# Patient Record
Sex: FEMALE | Race: WHITE | Employment: UNEMPLOYED | URBAN - METROPOLITAN AREA
[De-identification: names, ages, dates, MRNs, and addresses within clinical notes are randomized per-mention and may not be internally consistent; named-entity substitution may affect disease eponyms.]

---

## 2017-09-07 ENCOUNTER — HOSPITAL ENCOUNTER (EMERGENCY)
Facility: CLINIC | Age: 27
Discharge: HOME | End: 2017-09-07
Attending: EMERGENCY MEDICINE | Admitting: EMERGENCY MEDICINE
Payer: COMMERCIAL

## 2017-09-07 ENCOUNTER — APPOINTMENT (OUTPATIENT)
Dept: ULTRASOUND IMAGING | Facility: CLINIC | Age: 27
End: 2017-09-07
Attending: EMERGENCY MEDICINE
Payer: COMMERCIAL

## 2017-09-07 VITALS
HEIGHT: 64 IN | HEART RATE: 81 BPM | BODY MASS INDEX: 30.73 KG/M2 | WEIGHT: 180 LBS | TEMPERATURE: 98.8 F | OXYGEN SATURATION: 99 % | DIASTOLIC BLOOD PRESSURE: 75 MMHG | SYSTOLIC BLOOD PRESSURE: 90 MMHG | RESPIRATION RATE: 16 BRPM

## 2017-09-07 DIAGNOSIS — O21.0 HYPEREMESIS GRAVIDARUM: ICD-10-CM

## 2017-09-07 PROCEDURE — 96374 THER/PROPH/DIAG INJ IV PUSH: CPT

## 2017-09-07 PROCEDURE — 76801 OB US < 14 WKS SINGLE FETUS: CPT

## 2017-09-07 PROCEDURE — 25000128 H RX IP 250 OP 636: Performed by: EMERGENCY MEDICINE

## 2017-09-07 PROCEDURE — 99285 EMERGENCY DEPT VISIT HI MDM: CPT | Mod: 25

## 2017-09-07 RX ORDER — SODIUM CHLORIDE 9 MG/ML
1000 INJECTION, SOLUTION INTRAVENOUS CONTINUOUS
Status: DISCONTINUED | OUTPATIENT
Start: 2017-09-07 | End: 2017-09-07 | Stop reason: HOSPADM

## 2017-09-07 RX ORDER — ONDANSETRON 2 MG/ML
4 INJECTION INTRAMUSCULAR; INTRAVENOUS EVERY 30 MIN PRN
Status: DISCONTINUED | OUTPATIENT
Start: 2017-09-07 | End: 2017-09-07 | Stop reason: HOSPADM

## 2017-09-07 RX ADMIN — SODIUM CHLORIDE 1000 ML: 9 INJECTION, SOLUTION INTRAVENOUS at 18:47

## 2017-09-07 RX ADMIN — ONDANSETRON 4 MG: 2 INJECTION INTRAMUSCULAR; INTRAVENOUS at 18:47

## 2017-09-07 ASSESSMENT — ENCOUNTER SYMPTOMS
VOMITING: 1
CONSTIPATION: 1
ABDOMINAL DISTENTION: 1

## 2017-09-07 NOTE — ED NOTES
Patient pregnant and vomiting.  Currently on Methadone program for chronic pain.  Patient alert and oriented x3.  Airway, breathing and circulation intact.

## 2017-09-08 NOTE — ED PROVIDER NOTES
"  History     Chief Complaint:  Nausea & Vomiting      The history is provided by the patient.      Lisseth Andrews is a 27 year old female () who presents with nausea and vomiting. The patient had a pelvic exam last week. The patient is gravid but is unsure of how long. She did not have a menstrual period in August and endorses two positive pregnancy tests. The patient reports onset of vomiting today with 6 episodes. She endorsees decreased urination, cramping, normal greenish discharge, and feeling bloated and presents today for evaluation of health. She has had symptoms of constipation and less appetite. She denies any visit to the OB, or rash and has no other complaints.     Allergies:  Amoxicillin     Medications:    Methadone HCl  Albuterol     Past Medical History:    Asthma   Chronic pain   Depression  Fibromyalgia    Past Surgical History:    History reviewed. No pertinent surgical history.     Family History:    History reviewed. No pertinent family history.      Social History:  Presents with son    PCP: Physician No Ref-Primary    Marital Status:  Single     Review of Systems   Gastrointestinal: Positive for abdominal distention, constipation and vomiting.   Genitourinary: Positive for decreased urine volume and vaginal discharge (Normal ).   Skin: Negative for rash.   All other systems reviewed and are negative.    Physical Exam     Patient Vitals for the past 24 hrs:   BP Temp Temp src Pulse Resp SpO2 Height Weight   17 1930 90/75 - - - - - - -   17 1900 110/75 - - - - 99 % - -   17 1747 117/77 98.8  F (37.1  C) Temporal 81 16 99 % 1.626 m (5' 4\") 81.6 kg (180 lb)      Physical Exam  Constitutional: Patient is well appearing. No distress.  Head: Atraumatic.  Mouth/Throat: Oropharynx is clear and moist. No oropharyngeal exudate.  Eyes: Conjunctivae and EOM are normal. No scleral icterus.  Neck: Normal range of motion. Neck supple.   Cardiovascular: Normal rate, regular rhythm, " normal heart sounds and intact distal pulses.   Pulmonary/Chest: Breath sounds normal. No respiratory distress.  Abdominal: Soft. Bowel sounds are normal. No distension. No tenderness. No rebound or guarding.   Musculoskeletal: Normal range of motion. No edema or tenderness.   Neurological: Alert and orientated to person, place, and time. No observable focal neuro deficit  Skin: Warm and dry. No rash noted. Not diaphoretic.    Emergency Department Course     Imaging:  Radiographic findings were communicated with the patient who voiced understanding of the findings.  US OB <14 Weeks with transvaginal:  IMPRESSION: Single live intrauterine pregnancy measuring 6 weeks 0  days gestational age.    Imaging independently reviewed and agree with radiologist interpretation.      Laboratory:  UA UPT per report.    Interventions:  1847: NS 1L IV Bolus    1847: Zofran 4 mg IV   Medications   0.9% sodium chloride BOLUS (1,000 mLs Intravenous New Bag 9/7/17 1847)       Emergency Department Course:  Past medical records, nursing notes, and vitals reviewed.  1903: I performed an exam of the patient and obtained history, as documented above.  IV inserted and blood drawn.   Above interventions provided.   The patient was sent for a CT and US while in the emergency department, findings above.       Impression & Plan             Medical Decision Making:  Pt left before completion.    US as above urine had not returned.  Elopement.   Diagnosis:    ICD-10-CM    1. Hyperemesis gravidarum O21.0        Disposition:  Elopement    Discharge Medications:  Discharge Medication List as of 9/7/2017  9:13 PM            Michael Scott  9/7/2017   [facility] EMERGENCY DEPARTMENT  I, Michael Scott, am serving as a scribe at 7:03 PM on 9/7/2017 to document services personally performed by Mark Sher MD based on my observations and the provider's statements to me.       Mark Sher MD  09/08/17 0107

## 2018-05-22 ENCOUNTER — HOSPITAL ENCOUNTER (EMERGENCY)
Facility: CLINIC | Age: 28
Discharge: HOME | End: 2018-05-22
Attending: EMERGENCY MEDICINE | Admitting: EMERGENCY MEDICINE
Payer: COMMERCIAL

## 2018-05-22 VITALS
RESPIRATION RATE: 18 BRPM | BODY MASS INDEX: 29.02 KG/M2 | HEART RATE: 66 BPM | OXYGEN SATURATION: 100 % | WEIGHT: 170 LBS | TEMPERATURE: 98.2 F | DIASTOLIC BLOOD PRESSURE: 67 MMHG | SYSTOLIC BLOOD PRESSURE: 121 MMHG | HEIGHT: 64 IN

## 2018-05-22 DIAGNOSIS — O21.9 NAUSEA AND VOMITING DURING PREGNANCY: ICD-10-CM

## 2018-05-22 LAB
ALBUMIN UR-MCNC: 30 MG/DL
ANION GAP SERPL CALCULATED.3IONS-SCNC: 9 MMOL/L (ref 3–14)
APPEARANCE UR: ABNORMAL
BASOPHILS # BLD AUTO: 0 10E9/L (ref 0–0.2)
BASOPHILS NFR BLD AUTO: 0.2 %
BILIRUB UR QL STRIP: NEGATIVE
BUN SERPL-MCNC: 11 MG/DL (ref 7–30)
CALCIUM SERPL-MCNC: 9.4 MG/DL (ref 8.5–10.1)
CHLORIDE SERPL-SCNC: 102 MMOL/L (ref 94–109)
CO2 SERPL-SCNC: 26 MMOL/L (ref 20–32)
COLOR UR AUTO: YELLOW
CREAT SERPL-MCNC: 0.72 MG/DL (ref 0.52–1.04)
DIFFERENTIAL METHOD BLD: NORMAL
EOSINOPHIL # BLD AUTO: 0 10E9/L (ref 0–0.7)
EOSINOPHIL NFR BLD AUTO: 0.1 %
ERYTHROCYTE [DISTWIDTH] IN BLOOD BY AUTOMATED COUNT: 12.2 % (ref 10–15)
GFR SERPL CREATININE-BSD FRML MDRD: >90 ML/MIN/1.7M2
GLUCOSE SERPL-MCNC: 99 MG/DL (ref 70–99)
GLUCOSE UR STRIP-MCNC: NEGATIVE MG/DL
HCT VFR BLD AUTO: 40.5 % (ref 35–47)
HGB BLD-MCNC: 14.6 G/DL (ref 11.7–15.7)
HGB UR QL STRIP: NEGATIVE
IMM GRANULOCYTES # BLD: 0 10E9/L (ref 0–0.4)
IMM GRANULOCYTES NFR BLD: 0.3 %
KETONES UR STRIP-MCNC: 80 MG/DL
LEUKOCYTE ESTERASE UR QL STRIP: ABNORMAL
LYMPHOCYTES # BLD AUTO: 1.2 10E9/L (ref 0.8–5.3)
LYMPHOCYTES NFR BLD AUTO: 12.7 %
MCH RBC QN AUTO: 31.4 PG (ref 26.5–33)
MCHC RBC AUTO-ENTMCNC: 36 G/DL (ref 31.5–36.5)
MCV RBC AUTO: 87 FL (ref 78–100)
MONOCYTES # BLD AUTO: 0.4 10E9/L (ref 0–1.3)
MONOCYTES NFR BLD AUTO: 4.7 %
MUCOUS THREADS #/AREA URNS LPF: PRESENT /LPF
NEUTROPHILS # BLD AUTO: 7.6 10E9/L (ref 1.6–8.3)
NEUTROPHILS NFR BLD AUTO: 82 %
NITRATE UR QL: NEGATIVE
NRBC # BLD AUTO: 0 10*3/UL
NRBC BLD AUTO-RTO: 0 /100
PH UR STRIP: 7 PH (ref 5–7)
PLATELET # BLD AUTO: 165 10E9/L (ref 150–450)
POTASSIUM SERPL-SCNC: 3.5 MMOL/L (ref 3.4–5.3)
RBC # BLD AUTO: 4.65 10E12/L (ref 3.8–5.2)
RBC #/AREA URNS AUTO: 1 /HPF (ref 0–2)
SODIUM SERPL-SCNC: 137 MMOL/L (ref 133–144)
SOURCE: ABNORMAL
SP GR UR STRIP: 1.02 (ref 1–1.03)
SQUAMOUS #/AREA URNS AUTO: 2 /HPF (ref 0–1)
UROBILINOGEN UR STRIP-MCNC: 2 MG/DL (ref 0–2)
WBC # BLD AUTO: 9.3 10E9/L (ref 4–11)
WBC #/AREA URNS AUTO: 7 /HPF (ref 0–5)

## 2018-05-22 PROCEDURE — 80048 BASIC METABOLIC PNL TOTAL CA: CPT | Performed by: EMERGENCY MEDICINE

## 2018-05-22 PROCEDURE — 99285 EMERGENCY DEPT VISIT HI MDM: CPT | Mod: 25

## 2018-05-22 PROCEDURE — 85025 COMPLETE CBC W/AUTO DIFF WBC: CPT | Performed by: EMERGENCY MEDICINE

## 2018-05-22 PROCEDURE — 96375 TX/PRO/DX INJ NEW DRUG ADDON: CPT

## 2018-05-22 PROCEDURE — 96361 HYDRATE IV INFUSION ADD-ON: CPT

## 2018-05-22 PROCEDURE — 99284 EMERGENCY DEPT VISIT MOD MDM: CPT | Mod: 25

## 2018-05-22 PROCEDURE — 76801 OB US < 14 WKS SINGLE FETUS: CPT

## 2018-05-22 PROCEDURE — 81001 URINALYSIS AUTO W/SCOPE: CPT | Performed by: EMERGENCY MEDICINE

## 2018-05-22 PROCEDURE — 96374 THER/PROPH/DIAG INJ IV PUSH: CPT

## 2018-05-22 PROCEDURE — 96376 TX/PRO/DX INJ SAME DRUG ADON: CPT

## 2018-05-22 PROCEDURE — 25000128 H RX IP 250 OP 636: Performed by: EMERGENCY MEDICINE

## 2018-05-22 RX ORDER — ONDANSETRON 2 MG/ML
4 INJECTION INTRAMUSCULAR; INTRAVENOUS ONCE
Status: COMPLETED | OUTPATIENT
Start: 2018-05-22 | End: 2018-05-22

## 2018-05-22 RX ORDER — DIPHENHYDRAMINE HYDROCHLORIDE 50 MG/ML
25 INJECTION INTRAMUSCULAR; INTRAVENOUS ONCE
Status: COMPLETED | OUTPATIENT
Start: 2018-05-22 | End: 2018-05-22

## 2018-05-22 RX ORDER — METOCLOPRAMIDE HYDROCHLORIDE 5 MG/ML
10 INJECTION INTRAMUSCULAR; INTRAVENOUS ONCE
Status: DISCONTINUED | OUTPATIENT
Start: 2018-05-22 | End: 2018-05-22

## 2018-05-22 RX ORDER — ONDANSETRON 4 MG/1
4 TABLET, ORALLY DISINTEGRATING ORAL EVERY 8 HOURS PRN
Qty: 10 TABLET | Refills: 0 | Status: SHIPPED | OUTPATIENT
Start: 2018-05-22 | End: 2018-05-25

## 2018-05-22 RX ADMIN — ONDANSETRON 4 MG: 2 INJECTION INTRAMUSCULAR; INTRAVENOUS at 05:14

## 2018-05-22 RX ADMIN — DIPHENHYDRAMINE HYDROCHLORIDE 25 MG: 50 INJECTION, SOLUTION INTRAMUSCULAR; INTRAVENOUS at 05:02

## 2018-05-22 RX ADMIN — SODIUM CHLORIDE 1000 ML: 9 INJECTION, SOLUTION INTRAVENOUS at 05:02

## 2018-05-22 RX ADMIN — ONDANSETRON 4 MG: 2 INJECTION INTRAMUSCULAR; INTRAVENOUS at 06:08

## 2018-05-22 ASSESSMENT — ENCOUNTER SYMPTOMS
FEVER: 0
VOMITING: 1
NAUSEA: 1
ABDOMINAL PAIN: 0

## 2018-05-22 NOTE — DISCHARGE INSTRUCTIONS
Please follow up closely at your scheduled appointment with your Ob-gyn tomorrow.     Please return to the ED if your symptoms worsen or if you develop new or concerning symptoms.       Discharge Instructions  Vomiting    You have been seen today for vomiting. This is usually caused by a virus, but some bacteria, parasites, medicines or other medical conditions can cause similar symptoms. At this time your doctor does not find that your vomiting is a sign of anything dangerous or life-threatening. However, sometimes the signs of serious illness do not show up right away. If you have new or worse symptoms, you may need to be seen again in the emergency department or by your primary doctor. Remember that serious problems like appendicitis can start as vomiting.     Return to the Emergency Department if:    You keep throwing up and you are not able to keep liquids down.     You feel you are getting dehydrated, such as being very thirsty, not urinating at least every 8-12 hours, or feeling faint or lightheaded.     You develop a new fever, or your fever continues for more than 2 days.     You have belly pain that seems worse than cramps, is in one spot, or is getting worse over time.     You have blood in your vomit or stools.     You feel very weak.    You are not starting to improve within 24 hours of your visit here.     What can I do to help myself?    The most important thing to do is to drink clear liquids. If you have been vomiting a lot, it is best to have only small, frequent sips of liquids. Drinking too much at once may cause more vomiting. If you are vomiting often, you must replace minerals, sodium and potassium lost with your illness. Pedialyte  and sports drinks can help you replace these minerals. You can also drink clear liquids such as water, weak tea, apple juice, and 7-Up . Avoid acid liquids (orange), caffeine (coffee) or alcohol. Do not drink milk until you no longer have diarrhea.     After  liquids are staying down, you may start eating mild foods. Soda crackers, toast, plain noodles, gelatin, applesauce and bananas are good first choices. Avoid foods that have acid, are spicy, fatty or have a lot of fiber (such as meats, coarse grains, vegetables). You may start eating these foods again in about 3 days when you are better.     Sometimes treatment includes prescription medicine to prevent nausea and vomiting. If your doctor prescribes these for you, take them as directed.     Don t take ibuprofen, or other nonsteroidal anti-inflammatory medicines without checking with your healthcare provider.   If you were given a prescription for medicine here today, be sure to read all of the information (including the package insert) that comes with your prescription.  This will include important information about the medicine, its side effects, and any warnings that you need to know about.  The pharmacist who fills the prescription can provide more information and answer questions you may have about the medicine.  If you have questions or concerns that the pharmacist cannot address, please call or return to the Emergency Department.       Opioid Medication Information    Pain medications are among the most commonly prescribed medicines, so we are including this information for all our patients. If you did not receive pain medication or get a prescription for pain medicine, you can ignore it.     You may have been given a prescription for an opioid (narcotic) pain medicine and/or have received a pain medicine while here in the Emergency Department. These medicines can make you drowsy or impaired. You must not drive, operate dangerous equipment, or engage in any other dangerous activities while taking these medications. If you drive while taking these medications, you could be arrested for DUI, or driving under the influence. Do not drink any alcohol while you are taking these medications.     Opioid pain  medications can cause addiction. If you have a history of chemical dependency of any type, you are at a higher risk of becoming addicted to pain medications.  Only take these prescribed medications to treat your pain when all other options have been tried. Take it for as short a time and as few doses as possible. Store your pain pills in a secure place, as they are frequently stolen and provide a dangerous opportunity for children or visitors in your house to start abusing these powerful medications. We will not replace any lost or stolen medicine.  As soon as your pain is better, you should flush all your remaining medication.     Many prescription pain medications contain Tylenol  (acetaminophen), including Vicodin , Tylenol #3 , Norco , Lortab , and Percocet .  You should not take any extra pills of Tylenol  if you are using these prescription medications or you can get very sick.  Do not ever take more than 3000 mg of acetaminophen in any 24 hour period.    All opioids tend to cause constipation. Drink plenty of water and eat foods that have a lot of fiber, such as fruits, vegetables, prune juice, apple juice and high fiber cereal.  Take a laxative if you don t move your bowels at least every other day. Miralax , Milk of Magnesia, Colace , or Senna  can be used to keep you regular.      Remember that you can always come back to the Emergency Department if you are not able to see your regular doctor in the amount of time listed above, if you get any new symptoms, or if there is anything that worries you.

## 2018-05-22 NOTE — ED AVS SNAPSHOT
[facility] Emergency Department    [address]    [facility] [address]    Phone:  [phone]    Fax:  [phone]                                       Lisseth Andrews   MRN: [id number]    Department:  [facility] Emergency Department   Date of Visit:  5/22/2018           Patient Information     Date Of Birth          1990        Your diagnoses for this visit were:     Nausea and vomiting during pregnancy        You were seen by Rosalina Kothari MD and Billie Peña MD.      Follow-up Information     Follow up with [facility] In 3 days.    Specialties:  Sports Medicine, Pain Management, Obstetrics & Gynecology, Pediatrics, Internal Medicine, Nephrology    Contact information:    [address]  [facility] [address] [phone]        Follow up with [facility] Emergency Department.    Specialty:  EMERGENCY MEDICINE    Why:  If symptoms worsen    Contact information:    [address]  [facility] [address] [phone]        Discharge Instructions       Please follow up closely at your scheduled appointment with your Ob-gyn tomorrow.     Please return to the ED if your symptoms worsen or if you develop new or concerning symptoms.       Discharge Instructions  Vomiting    You have been seen today for vomiting. This is usually caused by a virus, but some bacteria, parasites, medicines or other medical conditions can cause similar symptoms. At this time your doctor does not find that your vomiting is a sign of anything dangerous or life-threatening. However, sometimes the signs of serious illness do not show up right away. If you have new or worse symptoms, you may need to be seen again in the emergency department or by your primary doctor. Remember that serious problems like appendicitis can start as vomiting.     Return to the Emergency Department if:    You keep throwing up and  you are not able to keep liquids down.     You feel you are getting dehydrated, such as being very thirsty, not urinating at least every 8-12 hours, or feeling faint or lightheaded.     You develop a new fever, or your fever continues for more than 2 days.     You have belly pain that seems worse than cramps, is in one spot, or is getting worse over time.     You have blood in your vomit or stools.     You feel very weak.    You are not starting to improve within 24 hours of your visit here.     What can I do to help myself?    The most important thing to do is to drink clear liquids. If you have been vomiting a lot, it is best to have only small, frequent sips of liquids. Drinking too much at once may cause more vomiting. If you are vomiting often, you must replace minerals, sodium and potassium lost with your illness. Pedialyte  and sports drinks can help you replace these minerals. You can also drink clear liquids such as water, weak tea, apple juice, and 7-Up . Avoid acid liquids (orange), caffeine (coffee) or alcohol. Do not drink milk until you no longer have diarrhea.     After liquids are staying down, you may start eating mild foods. Soda crackers, toast, plain noodles, gelatin, applesauce and bananas are good first choices. Avoid foods that have acid, are spicy, fatty or have a lot of fiber (such as meats, coarse grains, vegetables). You may start eating these foods again in about 3 days when you are better.     Sometimes treatment includes prescription medicine to prevent nausea and vomiting. If your doctor prescribes these for you, take them as directed.     Don t take ibuprofen, or other nonsteroidal anti-inflammatory medicines without checking with your healthcare provider.   If you were given a prescription for medicine here today, be sure to read all of the information (including the package insert) that comes with your prescription.  This will include important information about the medicine, its  side effects, and any warnings that you need to know about.  The pharmacist who fills the prescription can provide more information and answer questions you may have about the medicine.  If you have questions or concerns that the pharmacist cannot address, please call or return to the Emergency Department.       Opioid Medication Information    Pain medications are among the most commonly prescribed medicines, so we are including this information for all our patients. If you did not receive pain medication or get a prescription for pain medicine, you can ignore it.     You may have been given a prescription for an opioid (narcotic) pain medicine and/or have received a pain medicine while here in the Emergency Department. These medicines can make you drowsy or impaired. You must not drive, operate dangerous equipment, or engage in any other dangerous activities while taking these medications. If you drive while taking these medications, you could be arrested for DUI, or driving under the influence. Do not drink any alcohol while you are taking these medications.     Opioid pain medications can cause addiction. If you have a history of chemical dependency of any type, you are at a higher risk of becoming addicted to pain medications.  Only take these prescribed medications to treat your pain when all other options have been tried. Take it for as short a time and as few doses as possible. Store your pain pills in a secure place, as they are frequently stolen and provide a dangerous opportunity for children or visitors in your house to start abusing these powerful medications. We will not replace any lost or stolen medicine.  As soon as your pain is better, you should flush all your remaining medication.     Many prescription pain medications contain Tylenol  (acetaminophen), including Vicodin , Tylenol #3 , Norco , Lortab , and Percocet .  You should not take any extra pills of Tylenol  if you are using these  prescription medications or you can get very sick.  Do not ever take more than 3000 mg of acetaminophen in any 24 hour period.    All opioids tend to cause constipation. Drink plenty of water and eat foods that have a lot of fiber, such as fruits, vegetables, prune juice, apple juice and high fiber cereal.  Take a laxative if you don t move your bowels at least every other day. Miralax , Milk of Magnesia, Colace , or Senna  can be used to keep you regular.      Remember that you can always come back to the Emergency Department if you are not able to see your regular doctor in the amount of time listed above, if you get any new symptoms, or if there is anything that worries you.          24 Hour Appointment Hotline       To make an appointment at any [facility], call [phone] ([phone]). If you don't have a family doctor or clinic, we will help you find one. [place] clinics are conveniently located to serve the needs of you and your family.             Review of your medicines      START taking        Dose / Directions Last dose taken    ondansetron 4 MG ODT tab   Commonly known as:  ZOFRAN ODT   Dose:  4 mg   Quantity:  10 tablet        Take 1 tablet (4 mg) by mouth every 8 hours as needed   Refills:  0          Our records show that you are taking the medicines listed below. If these are incorrect, please call your family doctor or clinic.        Dose / Directions Last dose taken    ALBUTEROL IN        None Entered   Refills:  0        METHADONE HCL PO   Dose:  60 mg        Take 60 mg by mouth daily   Refills:  0                Prescriptions were sent or printed at these locations (1 Prescription)                   Other Prescriptions                Printed at Department/Unit printer (1 of 1)         ondansetron (ZOFRAN ODT) 4 MG ODT tab                Procedures and tests performed during your visit     Basic metabolic panel (BMP)    CBC + differential    POC US OB TRANSABDOMINAL LIMITED    UA  with Microscopic      Orders Needing Specimen Collection     None      Pending Results     Date and Time Order Name Status Description    5/22/2018 0453 POC US OB TRANSABDOMINAL LIMITED In process             Pending Culture Results     No orders found from 5/20/2018 to 5/23/2018.            Pending Results Instructions     If you had any lab results that were not finalized at the time of your Discharge, you can call the ED Lab Result RN at [phone]. You will be contacted by this team for any positive Lab results or changes in treatment. The nurses are available 7 days a week from 10A to 6:30P.  You can leave a message 24 hours per day and they will return your call.        Test Results From Your Hospital Stay        5/22/2018  5:11 AM      Component Results     Component Value Ref Range & Units Status    WBC 9.3 4.0 - 11.0 10e9/L Final    RBC Count 4.65 3.8 - 5.2 10e12/L Final    Hemoglobin 14.6 11.7 - 15.7 g/dL Final    Hematocrit 40.5 35.0 - 47.0 % Final    MCV 87 78 - 100 fl Final    MCH 31.4 26.5 - 33.0 pg Final    MCHC 36.0 31.5 - 36.5 g/dL Final    RDW 12.2 10.0 - 15.0 % Final    Platelet Count 165 150 - 450 10e9/L Final    Diff Method Automated Method  Final    % Neutrophils 82.0 % Final    % Lymphocytes 12.7 % Final    % Monocytes 4.7 % Final    % Eosinophils 0.1 % Final    % Basophils 0.2 % Final    % Immature Granulocytes 0.3 % Final    Nucleated RBCs 0 0 /100 Final    Absolute Neutrophil 7.6 1.6 - 8.3 10e9/L Final    Absolute Lymphocytes 1.2 0.8 - 5.3 10e9/L Final    Absolute Monocytes 0.4 0.0 - 1.3 10e9/L Final    Absolute Eosinophils 0.0 0.0 - 0.7 10e9/L Final    Absolute Basophils 0.0 0.0 - 0.2 10e9/L Final    Abs Immature Granulocytes 0.0 0 - 0.4 10e9/L Final    Absolute Nucleated RBC 0.0  Final         5/22/2018  5:27 AM      Component Results     Component Value Ref Range & Units Status    Sodium 137 133 - 144 mmol/L Final    Potassium 3.5 3.4 - 5.3 mmol/L Final    Chloride 102 94 - 109 mmol/L  Final    Carbon Dioxide 26 20 - 32 mmol/L Final    Anion Gap 9 3 - 14 mmol/L Final    Glucose 99 70 - 99 mg/dL Final    Urea Nitrogen 11 7 - 30 mg/dL Final    Creatinine 0.72 0.52 - 1.04 mg/dL Final    GFR Estimate >90 >60 mL/min/1.7m2 Final    Non  GFR Calc    GFR Estimate If Black >90 >60 mL/min/1.7m2 Final    African American GFR Calc    Calcium 9.4 8.5 - 10.1 mg/dL Final         5/22/2018  6:11 AM      Component Results     Component Value Ref Range & Units Status    Color Urine Yellow  Final    Appearance Urine Slightly Cloudy  Final    Glucose Urine Negative NEG^Negative mg/dL Final    Bilirubin Urine Negative NEG^Negative Final    Ketones Urine 80 (A) NEG^Negative mg/dL Final    Specific Gravity Urine 1.021 1.003 - 1.035 Final    Blood Urine Negative NEG^Negative Final    pH Urine 7.0 5.0 - 7.0 pH Final    Protein Albumin Urine 30 (A) NEG^Negative mg/dL Final    Urobilinogen mg/dL 2.0 0.0 - 2.0 mg/dL Final    Nitrite Urine Negative NEG^Negative Final    Leukocyte Esterase Urine Trace (A) NEG^Negative Final    Source Midstream Urine  Final    WBC Urine 7 (H) 0 - 5 /HPF Final    RBC Urine 1 0 - 2 /HPF Final    Squamous Epithelial /HPF Urine 2 (H) 0 - 1 /HPF Final    Mucous Urine Present (A) NEG^Negative /LPF Final         5/22/2018  5:13 AM      [facility] Procedure Note     Limited Bedside ED Pelvic Ultrasound (PREGNANT PATIENT):    PROCEDURE: PERFORMED BY: Dr. Rosalina Kothari  INDICATIONS/SYMPTOM: Pregnancy, Vomiting  PROBE: Low frequency convex probe  Type of US Study: Transabdominal Exam  BODY LOCATION: Pelvis  FINDINGS: Fetal heart rate: Present and counted at 144 bpm.  INTERPRETATION: Intrauterine pregnancy present. FHR was 144 with noted fetal activity.    IMAGE DOCUMENTATION: Images were archived to hard drive.                Clinical Quality Measure: Blood Pressure Screening     Your blood pressure was checked while you were in the emergency department today. The  "last reading we obtained was  BP: 121/67 . Please read the guidelines below about what these numbers mean and what you should do about them.  If your systolic blood pressure (the top number) is less than 120 and your diastolic blood pressure (the bottom number) is less than 80, then your blood pressure is normal. There is nothing more that you need to do about it.  If your systolic blood pressure (the top number) is 120-139 or your diastolic blood pressure (the bottom number) is 80-89, your blood pressure may be higher than it should be. You should have your blood pressure rechecked within a year by a primary care provider.  If your systolic blood pressure (the top number) is 140 or greater or your diastolic blood pressure (the bottom number) is 90 or greater, you may have high blood pressure. High blood pressure is treatable, but if left untreated over time it can put you at risk for heart attack, stroke, or kidney failure. You should have your blood pressure rechecked by a primary care provider within the next 4 weeks.  If your provider in the emergency department today gave you specific instructions to follow-up with your doctor or provider even sooner than that, you should follow that instruction and not wait for up to 4 weeks for your follow-up visit.        Thank you for choosing [facility]       Thank you for choosing [facility] for your care. Our goal is always to provide you with excellent care. Hearing back from our patients is one way we can continue to improve our services. Please take a few minutes to complete the written survey that you may receive in the mail after you visit with us. Thank you!        [employer] Information     [employer] lets you send messages to your doctor, view your test results, renew your prescriptions, schedule appointments and more. To sign up, go to [email or web address] . Click on \"Log in\" on the left side of the screen, which will take you to the Welcome page. Then click on \"Sign " "up Now\" on the right side of the page.     You will be asked to enter the access code listed below, as well as some personal information. Please follow the directions to create your username and password.     Your access code is: [email or web address]  Expires: 2018  7:44 AM     Your access code will  in 90 days. If you need help or a new code, please call your [place] clinic or [phone].        Care EveryWhere ID     This is your Care EveryWhere ID. This could be used by other organizations to access your [place] medical records  [id number]        Equal Access to Services     [facility] HospitalROSMERY : Yossi Vasques, bernadette royal, oralia villarreal, anila lewis . So [facility] [phone].    ATENCIÓN: Si habla español, tiene a horan disposición servicios gratuitos de asistencia lingüística. Katyame al [phone].    We comply with applicable federal civil rights laws and [place] laws. We do not discriminate on the basis of race, color, national origin, age, disability, sex, sexual orientation, or gender identity.            After Visit Summary       This is your record. Keep this with you and show to your community pharmacist(s) and doctor(s) at your next visit.                  "

## 2018-05-22 NOTE — ED TRIAGE NOTES
Pt here for nausea and vomiting started about 0400 yesterday and ongoing, vomited over 20 times since then. Was seen at  about 1 week ago, prescribed zofran but unable to get it fill due to insurance. ABCs intact, gcs 15, AA&Ox3.

## 2018-05-22 NOTE — ED PROVIDER NOTES
"  History     Chief Complaint:  Nausea and Vomiting     HPI   Lisseth Andrews is a  approximately 9 week pregnant 28 year old female who presents via EMS for evaluation of nausea and vomiting. Approximately two weeks ago the patient started to develop nausea and frequent vomiting. She was seen in an urgent care regarding this about a week ago and was prescribed Zofran but was unable to get this filled due to insurance issues. On , the patient was seen in an [facility] ED for similar symptoms. At that time, the patient had a bedside ultrasound that confirmed an IUP with good fetal movement and cardiac activity. She also had a negative UA and was discharged to home. This morning around 0400, the patient awoke with ongoing nausea and vomiting, prompting her to call EMS to bring her into the ED for evaluation. She was given a dose of Zofran ODT prior to arrival with some improvement of her symptoms. Otherwise, she has not had any measured fever, abdominal pain, vaginal discharge, or vaginal bleeding recently. She is scheduled to see her OB on .     Allergies:  Amoxicillin      Medications:    Albuterol  Methadone     Past Medical History:    Anxiety  Asthma  Chronic pain  Depression  Fibromyalgia  Post-traumatic stress disorder     Past Surgical History:    History reviewed. No pertinent past surgical history.     Family History:    History reviewed. No pertinent family history.     Social History:  Marital status:    Single   Accompanied to ED by:  EMS      Review of Systems   Constitutional: Negative for fever.   Gastrointestinal: Positive for nausea and vomiting. Negative for abdominal pain.   Genitourinary: Negative for vaginal bleeding and vaginal discharge.   All other systems reviewed and are negative.    Physical Exam   First Vitals:  BP: 114/75  Pulse: 66  Heart Rate: 66  Temp: 98.2  F (36.8  C)  Resp: 18  Height: 162.6 cm (5' 4\")  Weight: 77.1 kg (170 lb)  SpO2: 98 %      Physical " Exam  Constitutional: The patient is oriented to person, place, and time. Alert and cooperative.  HENT:   Right Ear: External ear normal.   Left Ear: External ear normal.   Nose: Nose normal.   Mouth/Throat: Uvula is midline, oropharynx is clear and moist and mucous membranes are normal. No posterior oropharyngeal edema or erythema.   Eyes: Conjunctivae, EOM and lids are normal. Pupils are equal, round, and reactive to light.   Neck: Trachea normal. Normal range of motion. Neck supple.   Cardiovascular: Normal rate, regular rhythm, normal heart sounds, and intact distal pulses.    Pulmonary/Chest: Effort normal and breath sounds equal bilaterally. No crackles or wheezing.   Abdominal: Soft. No tenderness to palpation. No rebound and no guarding.   Musculoskeletal: Normal range of motion.    Neurological: Alert and Oriented. Moves all extremities equally.   Skin: Skin is dry. No rash noted.          Emergency Department Course       Laboratory:  CBC: WNL (WBC 9.3, HGB 14.6, )  BMP: WNL (Creatinine 0.72)     UA with Microscopic: yellow, cloudy, 80 urine ketones, negative blood, nitrite negative, trace leuk est, 7 wbc's, 2 epithelial cells    Procedures:         POC US OB TRANSABDOMINAL LIMITED (In process) Result time: 05/22/18 05:12:02     In process by Rosalina Kothari MD (05/22/18 05:12:02)     Impression:     [facility] Procedure Note     Limited Bedside ED Pelvic Ultrasound (PREGNANT PATIENT):    PROCEDURE: PERFORMED BY: Dr. Rosalina Kothari  INDICATIONS/SYMPTOM: Pregnancy, Vomiting  PROBE: Low frequency convex probe  Type of US Study: Transabdominal Exam  BODY LOCATION: Pelvis  FINDINGS: Fetal heart rate: Present and counted at 144 bpm.  INTERPRETATION: Intrauterine pregnancy present. FHR was 144 with noted fetal activity.    IMAGE DOCUMENTATION: Images were archived to hard drive.       Interventions:  0502 Benadryl 25 mg IV   0502 NS 1,000 mL IV   0514 Zofran 4 mg IV     Emergency Department  Course:  Patient was brought to the ED via EMS.     Nursing notes and vitals reviewed.  0446: I performed an exam of the patient as documented above.     0455:  A POCUS was performed as outlined in the procedure note above.  The patient tolerated well and there were no complications.     I personally reviewed the laboratory results with the patient and answered all related questions prior to discharge.     Findings and plan explained to the Patient. Patient discharged home with instructions regarding supportive care, medications, and reasons to return. The importance of close follow-up was reviewed.     Impression & Plan      Medical Decision Making:  Lisseth Andrews is a  approximately 9 week pregnant 28 year old female who presents via EMS for evaluation of nausea and vomiting.  Upon presentation in the ED, the patient is nontoxic-appearing and vitals are within normal limits and stable.  On exam, she is well-appearing.  She is alert and with a nonfocal neurologic exam.  Cardiopulmonary exam is unremarkable.  Abdomen is soft and nontender throughout.  The rest of her exam is as mentioned above.  She was given IV fluids and antiemetic.  Bedside ultrasound was performed and demonstrates an intrauterine pregnancy with good fetal heart rate.  Labs were obtained and are as mentioned above.  Notably, she does not have a leukocytosis.  She has no significant electrolyte abnormality.  The patient denies any abdominal pain or vaginal bleeding to warrant formal OB ultrasound at this time. She has no abdominal tenderness on exam to suggest an acute surgical abdomen or to warrant further imaging of the abdomen at this time. Given the patient's history and presentation, I am most suspicious for nausea and vomiting of pregnancy.  The patient was signed out to my colleague pending repeat evaluation following IV fluid hydration and antiemetics.  If the patient is able to tolerate PO, I do feel she is safe for discharge to  home.  She notes that she has an appointment with her OB/GYN tomorrow, therefore she can follow up closely at this time.  She was stable/improved at the time of signout.     Diagnosis:    ICD-10-CM    1. Nausea and vomiting during pregnancy O21.9 UA with Microscopic       Disposition:  Discharged to home.     Discharge Medications:  New Prescriptions    ONDANSETRON (ZOFRAN ODT) 4 MG ODT TAB    Take 1 tablet (4 mg) by mouth every 8 hours as needed         Adriano FUNEZ, am serving as a scribe at 4:46 AM on 5/22/2018 to document services personally performed by Dr. Kothari, based on my observations and the provider's statements to me.    [facility] EMERGENCY DEPARTMENT       Rosalina Kothari MD  05/22/18 0643

## 2018-05-22 NOTE — ED PROVIDER NOTES
Patient endorsed to me by Dr. Kothari pending PO challenge in this 28 year old female with nausea and vomiting in first trimester.     Patient tolerated PO after 2 doses of Zofran as well as Benadryl and fluids. Clinically patient appears well on repeat evaluation and states she feels much improved since arrival. She is amenable to discharge plan as outlined by Dr. Kothari. She has appointment with OB tomorrow which I instructed her to keep though she will return with new or concerning symptoms.      Billie Peña MD  05/22/18 0743

## 2018-05-22 NOTE — ED AVS SNAPSHOT
[facility] [place] [facility] Emergency Department    [address]    [facility] [address]    Phone:  [phone]    Fax:  [phone]                                       Lisseth Andrews   MRN: [id number]    Department:  [facility] Emergency Department   Date of Visit:  5/22/2018           After Visit Summary Signature Page     I have received my discharge instructions, and my questions have been answered. I have discussed any challenges I see with this plan with the nurse or doctor.    ..........................................................................................................................................  Patient/Patient Representative Signature      ..........................................................................................................................................  Patient Representative Print Name and Relationship to Patient    ..................................................               ................................................  Date                                            Time    ..........................................................................................................................................  Reviewed by Signature/Title    ...................................................              ..............................................  Date                                                            Time

## 2019-03-31 ENCOUNTER — APPOINTMENT (OUTPATIENT)
Dept: GENERAL RADIOLOGY | Facility: CLINIC | Age: 29
End: 2019-03-31
Attending: EMERGENCY MEDICINE
Payer: COMMERCIAL

## 2019-03-31 ENCOUNTER — HOSPITAL ENCOUNTER (EMERGENCY)
Facility: CLINIC | Age: 29
Discharge: HOME | End: 2019-03-31
Attending: EMERGENCY MEDICINE | Admitting: EMERGENCY MEDICINE
Payer: COMMERCIAL

## 2019-03-31 VITALS
DIASTOLIC BLOOD PRESSURE: 68 MMHG | BODY MASS INDEX: 29.18 KG/M2 | TEMPERATURE: 99.5 F | SYSTOLIC BLOOD PRESSURE: 115 MMHG | HEART RATE: 53 BPM | RESPIRATION RATE: 16 BRPM | WEIGHT: 170 LBS | OXYGEN SATURATION: 100 %

## 2019-03-31 DIAGNOSIS — R00.2 PALPITATIONS: Primary | ICD-10-CM

## 2019-03-31 DIAGNOSIS — R11.2 NON-INTRACTABLE VOMITING WITH NAUSEA, UNSPECIFIED VOMITING TYPE: ICD-10-CM

## 2019-03-31 LAB
ALBUMIN SERPL-MCNC: 3.6 G/DL (ref 3.4–5)
ALBUMIN UR-MCNC: NEGATIVE MG/DL
ALP SERPL-CCNC: 83 U/L (ref 40–150)
ALT SERPL W P-5'-P-CCNC: 18 U/L (ref 0–50)
ANION GAP SERPL CALCULATED.3IONS-SCNC: 3 MMOL/L (ref 3–14)
APPEARANCE UR: CLEAR
AST SERPL W P-5'-P-CCNC: 27 U/L (ref 0–45)
BACTERIA #/AREA URNS HPF: ABNORMAL /HPF
BASOPHILS # BLD AUTO: 0 10E9/L (ref 0–0.2)
BASOPHILS NFR BLD AUTO: 0.3 %
BILIRUB SERPL-MCNC: 0.2 MG/DL (ref 0.2–1.3)
BILIRUB UR QL STRIP: NEGATIVE
BUN SERPL-MCNC: 18 MG/DL (ref 7–30)
CALCIUM SERPL-MCNC: 8.6 MG/DL (ref 8.5–10.1)
CHLORIDE SERPL-SCNC: 106 MMOL/L (ref 94–109)
CO2 SERPL-SCNC: 29 MMOL/L (ref 20–32)
COLOR UR AUTO: ABNORMAL
CREAT SERPL-MCNC: 0.84 MG/DL (ref 0.52–1.04)
D DIMER PPP FEU-MCNC: 0.5 UG/ML FEU (ref 0–0.5)
DIFFERENTIAL METHOD BLD: NORMAL
EOSINOPHIL # BLD AUTO: 0.1 10E9/L (ref 0–0.7)
EOSINOPHIL NFR BLD AUTO: 1.5 %
ERYTHROCYTE [DISTWIDTH] IN BLOOD BY AUTOMATED COUNT: 14.4 % (ref 10–15)
GFR SERPL CREATININE-BSD FRML MDRD: >90 ML/MIN/{1.73_M2}
GLUCOSE SERPL-MCNC: 105 MG/DL (ref 70–99)
GLUCOSE UR STRIP-MCNC: NEGATIVE MG/DL
HCG UR QL: NEGATIVE
HCT VFR BLD AUTO: 38.6 % (ref 35–47)
HGB BLD-MCNC: 12.9 G/DL (ref 11.7–15.7)
HGB UR QL STRIP: NEGATIVE
IMM GRANULOCYTES # BLD: 0 10E9/L (ref 0–0.4)
IMM GRANULOCYTES NFR BLD: 0.1 %
INTERPRETATION ECG - MUSE: NORMAL
KETONES UR STRIP-MCNC: NEGATIVE MG/DL
LEUKOCYTE ESTERASE UR QL STRIP: NEGATIVE
LIPASE SERPL-CCNC: 79 U/L (ref 73–393)
LYMPHOCYTES # BLD AUTO: 2.1 10E9/L (ref 0.8–5.3)
LYMPHOCYTES NFR BLD AUTO: 30.6 %
MCH RBC QN AUTO: 29.4 PG (ref 26.5–33)
MCHC RBC AUTO-ENTMCNC: 33.4 G/DL (ref 31.5–36.5)
MCV RBC AUTO: 88 FL (ref 78–100)
MONOCYTES # BLD AUTO: 0.5 10E9/L (ref 0–1.3)
MONOCYTES NFR BLD AUTO: 7.5 %
NEUTROPHILS # BLD AUTO: 4 10E9/L (ref 1.6–8.3)
NEUTROPHILS NFR BLD AUTO: 60 %
NITRATE UR QL: NEGATIVE
NRBC # BLD AUTO: 0 10*3/UL
NRBC BLD AUTO-RTO: 0 /100
PH UR STRIP: 6 PH (ref 5–7)
PLATELET # BLD AUTO: 215 10E9/L (ref 150–450)
POTASSIUM SERPL-SCNC: 3.8 MMOL/L (ref 3.4–5.3)
PROT SERPL-MCNC: 7.1 G/DL (ref 6.8–8.8)
RBC # BLD AUTO: 4.39 10E12/L (ref 3.8–5.2)
RBC #/AREA URNS AUTO: 1 /HPF (ref 0–2)
SODIUM SERPL-SCNC: 138 MMOL/L (ref 133–144)
SOURCE: ABNORMAL
SP GR UR STRIP: 1.01 (ref 1–1.03)
SQUAMOUS #/AREA URNS AUTO: <1 /HPF (ref 0–1)
TROPONIN I SERPL-MCNC: <0.015 UG/L (ref 0–0.04)
TSH SERPL DL<=0.005 MIU/L-ACNC: 1.79 MU/L (ref 0.4–4)
UROBILINOGEN UR STRIP-MCNC: NORMAL MG/DL (ref 0–2)
WBC # BLD AUTO: 6.7 10E9/L (ref 4–11)
WBC #/AREA URNS AUTO: 4 /HPF (ref 0–5)

## 2019-03-31 PROCEDURE — 84443 ASSAY THYROID STIM HORMONE: CPT | Performed by: EMERGENCY MEDICINE

## 2019-03-31 PROCEDURE — 99285 EMERGENCY DEPT VISIT HI MDM: CPT | Mod: 25

## 2019-03-31 PROCEDURE — 81025 URINE PREGNANCY TEST: CPT | Performed by: EMERGENCY MEDICINE

## 2019-03-31 PROCEDURE — 81001 URINALYSIS AUTO W/SCOPE: CPT | Performed by: EMERGENCY MEDICINE

## 2019-03-31 PROCEDURE — 25000128 H RX IP 250 OP 636: Performed by: EMERGENCY MEDICINE

## 2019-03-31 PROCEDURE — 71046 X-RAY EXAM CHEST 2 VIEWS: CPT

## 2019-03-31 PROCEDURE — 84484 ASSAY OF TROPONIN QUANT: CPT | Performed by: EMERGENCY MEDICINE

## 2019-03-31 PROCEDURE — 93005 ELECTROCARDIOGRAM TRACING: CPT

## 2019-03-31 PROCEDURE — 96361 HYDRATE IV INFUSION ADD-ON: CPT

## 2019-03-31 PROCEDURE — 96374 THER/PROPH/DIAG INJ IV PUSH: CPT

## 2019-03-31 PROCEDURE — 85379 FIBRIN DEGRADATION QUANT: CPT | Performed by: EMERGENCY MEDICINE

## 2019-03-31 PROCEDURE — 83690 ASSAY OF LIPASE: CPT | Performed by: EMERGENCY MEDICINE

## 2019-03-31 PROCEDURE — 85025 COMPLETE CBC W/AUTO DIFF WBC: CPT | Performed by: EMERGENCY MEDICINE

## 2019-03-31 PROCEDURE — 96375 TX/PRO/DX INJ NEW DRUG ADDON: CPT

## 2019-03-31 PROCEDURE — 80053 COMPREHEN METABOLIC PANEL: CPT | Performed by: EMERGENCY MEDICINE

## 2019-03-31 RX ORDER — HYDROXYZINE HYDROCHLORIDE 50 MG/1
50 TABLET, FILM COATED ORAL 3 TIMES DAILY PRN
COMMUNITY

## 2019-03-31 RX ORDER — LORAZEPAM 2 MG/ML
1 INJECTION INTRAMUSCULAR ONCE
Status: COMPLETED | OUTPATIENT
Start: 2019-03-31 | End: 2019-03-31

## 2019-03-31 RX ORDER — ONDANSETRON 4 MG/1
4 TABLET, ORALLY DISINTEGRATING ORAL EVERY 8 HOURS PRN
Qty: 10 TABLET | Refills: 0 | Status: SHIPPED | OUTPATIENT
Start: 2019-03-31 | End: 2019-09-16

## 2019-03-31 RX ORDER — ONDANSETRON 2 MG/ML
4 INJECTION INTRAMUSCULAR; INTRAVENOUS ONCE
Status: COMPLETED | OUTPATIENT
Start: 2019-03-31 | End: 2019-03-31

## 2019-03-31 RX ADMIN — SODIUM CHLORIDE 1000 ML: 9 INJECTION, SOLUTION INTRAVENOUS at 02:20

## 2019-03-31 RX ADMIN — LORAZEPAM 1 MG: 2 INJECTION INTRAMUSCULAR; INTRAVENOUS at 03:01

## 2019-03-31 RX ADMIN — ONDANSETRON 4 MG: 2 INJECTION INTRAMUSCULAR; INTRAVENOUS at 02:20

## 2019-03-31 SDOH — HEALTH STABILITY: MENTAL HEALTH: HOW OFTEN DO YOU HAVE A DRINK CONTAINING ALCOHOL?: NEVER

## 2019-03-31 ASSESSMENT — ENCOUNTER SYMPTOMS
SHORTNESS OF BREATH: 0
BACK PAIN: 1
VOMITING: 1
COUGH: 1
ABDOMINAL PAIN: 1
PALPITATIONS: 1
COLOR CHANGE: 1
LIGHT-HEADEDNESS: 1
FEVER: 1
NAUSEA: 1

## 2019-03-31 NOTE — ED PROVIDER NOTES
History     Chief Complaint:  Vomiting, Palpitations, and Chest Pain     The history is provided by the patient.      Lisseth Andrews is a 29 year old female who presents via EMS with progressively worsening nausea since waking up at 2230. She reports one episode of vomiting and annotates she is the most concerned with subsequent onset of chest pain and palpitations. Patient also complains of lightheadedness, upper back pain, and epigastric pain as well as mild cough and congestion. Additionally, she states she felt feverish earlier. She denies any current chest pain but endorses continued palpitations. She denies any shortness of breath or recent injuries. She affirms a color change of unknown origin on the left-side of her face. Patient reports she is currently on the last day of her Holter monitor monitoring her propensity for palpitations.     Allergies:  Amoxicillin: rash    Medications:    Atarax  Methadone    Past Medical History:    Anxiety  Asthma  Chronic pain  Fibromyalgia  Depression  PTSD    Past Surgical History:    History reviewed. No pertinent surgical history.    Family History:    History reviewed. No pertinent family history.    Social History:  Smoking Status: Current Every Day Smoker (PPD: 0.25)  Alcohol Use: Negative  Marital Status: Single      Review of Systems   Constitutional: Positive for fever.   HENT: Positive for congestion.    Respiratory: Positive for cough. Negative for shortness of breath.    Cardiovascular: Positive for chest pain and palpitations.   Gastrointestinal: Positive for abdominal pain, nausea and vomiting.   Musculoskeletal: Positive for back pain.   Skin: Positive for color change.   Neurological: Positive for light-headedness.   All other systems reviewed and are negative.    Physical Exam     Patient Vitals for the past 24 hrs:   BP Temp Temp src Pulse Heart Rate Resp SpO2 Weight   03/31/19 0230 116/62 -- -- 53 62 -- -- --   03/31/19 0115 125/77 -- -- -- -- --  99 % --   03/31/19 0114 127/79 99.5  F (37.5  C) Oral -- 77 16 100 % 77.1 kg (170 lb)     Physical Exam  General: Alert, appears well-developed and well-nourished. Cooperative.     In mild distress  HEENT:  Head:  Atraumatic  Ears:  External ears are normal  Mouth/Throat:  Oropharynx is without erythema or exudate and mucous membranes are dry.   Eyes:   Conjunctivae normal and EOM are normal. No scleral icterus.  CV:  Normal rate, regular rhythm, normal heart sounds and radial pulses are 2+ and symmetric.  No murmur.  Resp:  Breath sounds are clear bilaterally    Non-labored, no retractions or accessory muscle use  GI:  Abdomen is soft, no distension, no tenderness. No rebound or guarding.  No CVA tenderness bilaterally  MS:  Normal range of motion. No edema.    Normal strength in all 4 extremities.     Back atraumatic.    No midline cervical, thoracic, or lumbar tenderness  Skin:  Warm and dry.  No rash or lesions noted.  Neuro: Alert. Normal strength.  GCS: 15  Psych:  Normal mood and affect.    Emergency Department Course     ECG:  ECG taken at 0154, ECG read at 0205  Normal sinus rhythm with sinus arrhythmia  Prolonged QT  Abnormal ECG  Rate 67 bpm. SC interval 152 ms. QRS duration 88 ms. QT/QTc 464/490 ms. P-R-T axes 35 31 18.    Imaging:  Radiology findings were communicated with the patient who voiced understanding of the findings.    XR Chest 2 Views  Normal two views of the chest.   KRISTI HOWELL MD  Reading per radiology    Laboratory:  Laboratory findings were communicated with the patient who voiced understanding of the findings.    UA: bacteria few (A) o/w WNL  UPT: Negative  Lipase: 79  CBC: WBC 6.7, HGB 12.9,   CMP: glucose 105 (H) o/w WNL (Creatinine 0.84)  D dimer: 0.5  Troponin (Collected 0151): <0.015  TSH with free T4 reflex: 1.79    Interventions:  0220: NS 1L IV Bolus   0220: Zofran 4 mg IV  0301: Ativan 1 mg IV    Emergency Department Course:  0119 Nursing notes and vitals  reviewed.    0130 I performed an exam of the patient as documented above.     0151 IV was inserted and blood was drawn for laboratory testing, results above. The patient provided a urine sample here in the emergency department. This was sent for laboratory testing, findings above.    0154 EKG obtained in the ED, see results above.     0235 The patient was sent for a x-ray while in the emergency department, results above.     0326 Patient rechecked and updated. I personally reviewed the imaging and laboratory results with the patient and answered all related questions prior to discharge.    Impression & Plan      Medical Decision Making:  Patient is a 29-year-old female with past medical history of fibromyalgia, depression, anxiety, and PTSD who presents with nausea and one episode of vomiting.  She also has persistent palpitations.  Patient has been seen by her primary care physician for these palpitations in the past and is currently on a Holter monitor which concludes in the next 24-48 hours.  She is not actively having palpitations here today, and reassuringly her EKG shows no concerning ischemic changes or abnormal rhythms today.  There are no prior EKGs to compare with.  Due to patient's nausea and vomiting we did do a broad workup.  CMP and CBC are unremarkable.  Lipase within normal limits.  Patient's troponin was undetectable and d-dimer within normal limits, lower concern for pulmonary embolism or ACS.  Patient had a normal TSH, lower concern for thyroid dysfunction.  Urinalysis with no concerns for infection and UPT negative.  Unclear the exact etiology of the patient's nausea and vomiting symptoms but suspicion for potential GERD versus gastritis versus potential viral illness.  She has a nonfocal abdominal exam and low concern for sinister intra-abdominal pathologies.  No indication for advanced CT imaging or other imaging studies at this time.  Return precautions for development of fever, inability to  tolerate oral intake, or any new or worsening symptoms were discussed at bedside prior to discharge.  All questions answered prior to discharge.  She will continue with her Holter monitor and follow-up as discussed with her primary care provider with cardiology in the next 1-2 weeks.  Discharged home.    Diagnosis:    ICD-10-CM   1. Palpitations R00.2     Disposition:   The patient is discharged to home.    Scribe Disclosure:  Harjeet FUNEZ, am serving as a scribe at 1:30 AM on 3/31/2019 to document services personally performed by Carlito Garcia MD based on my observations and the provider's statements to me.    [facility] EMERGENCY DEPARTMENT       Carlito Garcia MD  03/31/19 0848

## 2019-03-31 NOTE — DISCHARGE INSTRUCTIONS
Discharge Instructions  Vomiting    You have been seen today for vomiting (throwing up). This is usually caused by a virus, but some bacteria, parasites, medicines or other medical conditions can cause similar symptoms. At this time your provider does not find that your vomiting is a sign of anything dangerous or life-threatening. However, sometimes the signs of serious illness do not show up right away. If you have new or worse symptoms, you may need to be seen again in the Emergency Department or by your primary provider. Remember that serious problems like appendicitis can start as vomiting.    Generally, every Emergency Department visit should have a follow-up clinic visit with either a primary or a specialty clinic/provider. Please follow-up as instructed by your emergency provider today.    Return to the Emergency Department if:  You keep vomiting and you are not able to keep liquids down.   You feel you are getting dehydrated, such as being very thirsty, not urinating (peeing) at least every 8-12 hours, or feeling faint or lightheaded.   You develop a new fever, or your fever continues for more than 2 days.   You have abdominal (belly pain) that seems worse than cramps, is in one spot, or is getting worse over time. Appendicitis usually causes pain in the right lower abdomen (to the right and below your belly button) so watch for pain in this location.  You have blood in your vomit or stools.   You feel very weak.  You are not starting to improve within 24 hours of your visit here.     What can I do to help myself?  The most important thing to do is to drink clear liquids. If you have been vomiting a lot, it is best to have only small, frequent sips of liquids. Drinking too much at once may cause more vomiting. If you are vomiting often, you must replace minerals, sodium and potassium lost with your illness. Pedialyte  is the best available rehydration liquid but some find that it doesn?t taste good so sports  drinks are an alterative. You can also drink clear liquids such as water, weak tea, apple juice, and 7-Up . Avoid acid liquids (orange), caffeine (coffee) or alcohol. Do not drink milk until you no longer have diarrhea (loose stools).   After liquids are staying down, you may start eating mild foods. Soda crackers, toast, plain noodles, gelatin, applesauce and bananas are good first choices. Avoid foods that have acid, are spicy, fatty or have a lot of fiber (such as meats, coarse grains, vegetables). You may start eating these foods again in about 3 days when you are better.   Sometimes treatment includes prescription medicine to prevent nausea (sick to your stomach) and vomiting. If your provider prescribes these for you, take them as directed.   Do not take ibuprofen, naproxen, or other nonsteroidal anti-inflammatory (NSAID) medicines without checking with your healthcare provider.     If you were given a prescription for medicine here today, be sure to read all of the information (including the package insert) that comes with your prescription.  This will include important information about the medicine, its side effects, and any warnings that you need to know about.  The pharmacist who fills the prescription can provide more information and answer questions you may have about the medicine.  If you have questions or concerns that the pharmacist cannot address, please call or return to the Emergency Department.     Remember that you can always come back to the Emergency Department if you are not able to see your regular provider in the amount of time listed above, if you get any new symptoms, or if there is anything that worries you.  Discharge Instructions  Palpitations    Palpitations are an unusual awareness of your heartbeat. People often describe this as the heart skipping, fluttering, racing, irregular, or pounding. At this time, your provider has found no signs that your palpitations are due to a serious  or life-threatening condition. However, sometimes there is a serious problem that does not show up right away.    Palpitations can be caused by caffeine, cigarettes, diet pills, energy drinks or supplements, other stimulants, and medications and street drugs. They can also be caused by anxiety, hormone conditions such as high thyroid, and other medical conditions. Sometimes they are a sign of abnormal rhythm in the heart. At this time, your provider did not find any dangerous cause of your symptoms.    Generally, every Emergency Department visit should have a follow-up clinic visit with either a primary or a specialty clinic/provider. Please follow-up as instructed by your emergency provider today.    Return to the Emergency Department if:  You get chest pain or tightness.  You are short of breath.  You get very weak or tired.  You pass out or faint.  Your heart rate is over 120 beats per minute for more than 10 minutes while you are resting.   You have anything else that worries you.    What can I do to help myself?  Fill any prescriptions the provider gave you and take them right away.   Follow your provider?s instructions about the prescription medicines you are on. Sometimes the provider may tell you to stop taking a medicine or change the dose.  If you smoke, this may be a good time to quit! The less you can smoke, the better.  Do not use energy drinks, diet pills, or stimulants. Limit your use of caffeine.  If you were given a prescription for medicine here today, be sure to read all of the information (including the package insert) that comes with your prescription.  This will include important information about the medicine, its side effects, and any warnings that you need to know about.  The pharmacist who fills the prescription can provide more information and answer questions you may have about the medicine.  If you have questions or concerns that the pharmacist cannot address, please call or return to  the Emergency Department.     Remember that you can always come back to the Emergency Department if you are not able to see your regular provider in the amount of time listed above, if you get any new symptoms, or if there is anything that worries you.

## 2019-03-31 NOTE — ED AVS SNAPSHOT
[facility] Emergency Department  [address]  [facility] [address]  Phone:  [phone]  Fax:  [phone]                                    Lisseth Andrews   MRN: [id number]    Department:  [facility] Emergency Department   Date of Visit:  3/31/2019           After Visit Summary Signature Page    I have received my discharge instructions, and my questions have been answered. I have discussed any challenges I see with this plan with the nurse or doctor.    ..........................................................................................................................................  Patient/Patient Representative Signature      ..........................................................................................................................................  Patient Representative Print Name and Relationship to Patient    ..................................................               ................................................  Date                                   Time    ..........................................................................................................................................  Reviewed by Signature/Title    ...................................................              ..............................................  Date                                               Time          22EPIC Rev 08/18

## 2019-03-31 NOTE — ED TRIAGE NOTES
Awoke with nausea at 2230, emesis x one, felt heart racing , is being evaluAted for racing heart had been on holter monitor which she will turn in tomorrow, otherwise alert and oriented

## 2019-03-31 NOTE — ED NOTES
Bed: ED13  Expected date: 3/31/19  Expected time: 1:01 AM  Means of arrival: Ambulance  Comments:  [phone]

## 2019-04-09 ENCOUNTER — APPOINTMENT (OUTPATIENT)
Dept: GENERAL RADIOLOGY | Facility: CLINIC | Age: 29
End: 2019-04-09
Attending: PHYSICIAN ASSISTANT
Payer: COMMERCIAL

## 2019-04-09 ENCOUNTER — HOSPITAL ENCOUNTER (EMERGENCY)
Facility: CLINIC | Age: 29
Discharge: HOME | End: 2019-04-09
Attending: PHYSICIAN ASSISTANT | Admitting: PHYSICIAN ASSISTANT
Payer: COMMERCIAL

## 2019-04-09 VITALS
BODY MASS INDEX: 30.04 KG/M2 | TEMPERATURE: 98.9 F | DIASTOLIC BLOOD PRESSURE: 78 MMHG | HEART RATE: 80 BPM | WEIGHT: 175 LBS | OXYGEN SATURATION: 99 % | SYSTOLIC BLOOD PRESSURE: 121 MMHG | RESPIRATION RATE: 20 BRPM

## 2019-04-09 DIAGNOSIS — R07.9 CHEST PAIN, UNSPECIFIED TYPE: ICD-10-CM

## 2019-04-09 DIAGNOSIS — R42 DIZZINESS: ICD-10-CM

## 2019-04-09 LAB
ANION GAP SERPL CALCULATED.3IONS-SCNC: 3 MMOL/L (ref 3–14)
BASOPHILS # BLD AUTO: 0 10E9/L (ref 0–0.2)
BASOPHILS NFR BLD AUTO: 0.5 %
BUN SERPL-MCNC: 10 MG/DL (ref 7–30)
CALCIUM SERPL-MCNC: 8.9 MG/DL (ref 8.5–10.1)
CHLORIDE SERPL-SCNC: 105 MMOL/L (ref 94–109)
CO2 SERPL-SCNC: 30 MMOL/L (ref 20–32)
CREAT SERPL-MCNC: 0.84 MG/DL (ref 0.52–1.04)
D DIMER PPP FEU-MCNC: 0.5 UG/ML FEU (ref 0–0.5)
DIFFERENTIAL METHOD BLD: NORMAL
EOSINOPHIL # BLD AUTO: 0 10E9/L (ref 0–0.7)
EOSINOPHIL NFR BLD AUTO: 0.5 %
ERYTHROCYTE [DISTWIDTH] IN BLOOD BY AUTOMATED COUNT: 13.2 % (ref 10–15)
GFR SERPL CREATININE-BSD FRML MDRD: >90 ML/MIN/{1.73_M2}
GLUCOSE SERPL-MCNC: 115 MG/DL (ref 70–99)
HCG SERPL QL: NEGATIVE
HCT VFR BLD AUTO: 37.8 % (ref 35–47)
HGB BLD-MCNC: 12.7 G/DL (ref 11.7–15.7)
IMM GRANULOCYTES # BLD: 0 10E9/L (ref 0–0.4)
IMM GRANULOCYTES NFR BLD: 0.2 %
LYMPHOCYTES # BLD AUTO: 1.2 10E9/L (ref 0.8–5.3)
LYMPHOCYTES NFR BLD AUTO: 18.3 %
MCH RBC QN AUTO: 29.3 PG (ref 26.5–33)
MCHC RBC AUTO-ENTMCNC: 33.6 G/DL (ref 31.5–36.5)
MCV RBC AUTO: 87 FL (ref 78–100)
MONOCYTES # BLD AUTO: 0.4 10E9/L (ref 0–1.3)
MONOCYTES NFR BLD AUTO: 5.8 %
NEUTROPHILS # BLD AUTO: 4.7 10E9/L (ref 1.6–8.3)
NEUTROPHILS NFR BLD AUTO: 74.7 %
NRBC # BLD AUTO: 0 10*3/UL
NRBC BLD AUTO-RTO: 0 /100
PLATELET # BLD AUTO: 220 10E9/L (ref 150–450)
POTASSIUM SERPL-SCNC: 4 MMOL/L (ref 3.4–5.3)
RBC # BLD AUTO: 4.33 10E12/L (ref 3.8–5.2)
SODIUM SERPL-SCNC: 138 MMOL/L (ref 133–144)
T4 FREE SERPL-MCNC: 0.84 NG/DL (ref 0.76–1.46)
TROPONIN I SERPL-MCNC: <0.015 UG/L (ref 0–0.04)
TSH SERPL DL<=0.005 MIU/L-ACNC: 0.3 MU/L (ref 0.4–4)
WBC # BLD AUTO: 6.3 10E9/L (ref 4–11)

## 2019-04-09 PROCEDURE — 85025 COMPLETE CBC W/AUTO DIFF WBC: CPT | Performed by: PHYSICIAN ASSISTANT

## 2019-04-09 PROCEDURE — 25000128 H RX IP 250 OP 636: Performed by: PHYSICIAN ASSISTANT

## 2019-04-09 PROCEDURE — 84703 CHORIONIC GONADOTROPIN ASSAY: CPT | Performed by: PHYSICIAN ASSISTANT

## 2019-04-09 PROCEDURE — 96360 HYDRATION IV INFUSION INIT: CPT

## 2019-04-09 PROCEDURE — 99285 EMERGENCY DEPT VISIT HI MDM: CPT | Mod: 25

## 2019-04-09 PROCEDURE — 84443 ASSAY THYROID STIM HORMONE: CPT | Performed by: PHYSICIAN ASSISTANT

## 2019-04-09 PROCEDURE — 84439 ASSAY OF FREE THYROXINE: CPT | Performed by: PHYSICIAN ASSISTANT

## 2019-04-09 PROCEDURE — 84484 ASSAY OF TROPONIN QUANT: CPT | Performed by: PHYSICIAN ASSISTANT

## 2019-04-09 PROCEDURE — 71046 X-RAY EXAM CHEST 2 VIEWS: CPT

## 2019-04-09 PROCEDURE — 80048 BASIC METABOLIC PNL TOTAL CA: CPT | Performed by: PHYSICIAN ASSISTANT

## 2019-04-09 PROCEDURE — 85379 FIBRIN DEGRADATION QUANT: CPT | Performed by: PHYSICIAN ASSISTANT

## 2019-04-09 PROCEDURE — 93005 ELECTROCARDIOGRAM TRACING: CPT

## 2019-04-09 RX ADMIN — SODIUM CHLORIDE 1000 ML: 9 INJECTION, SOLUTION INTRAVENOUS at 15:53

## 2019-04-09 ASSESSMENT — ENCOUNTER SYMPTOMS
MYALGIAS: 1
LIGHT-HEADEDNESS: 1
HEMATURIA: 0
DYSURIA: 0
PALPITATIONS: 1
DIZZINESS: 1
FREQUENCY: 0
VOMITING: 0
DIFFICULTY URINATING: 0
ABDOMINAL PAIN: 1
NAUSEA: 1
COUGH: 0
SHORTNESS OF BREATH: 0
FEVER: 0
NERVOUS/ANXIOUS: 1
HEADACHES: 1

## 2019-04-09 NOTE — ED NOTES
Patient presents with mid-sternal chest pain with feelings of a high heart rate. Patient states she misplaced her methadone today, and has not taken her dose, which is making her feel worse. ABCDs intact, alert and oriented x 4.

## 2019-04-09 NOTE — ED PROVIDER NOTES
"  History     Chief Complaint:  Chest Pain    HPI   Lisseth Andrews is a 29 year old female with a history of asthma and anxiety who presents to the emergency department today with heart palpitations and chest pain among other concerns. The patient reports that this has been ongoing for sometime now and that she has been seen and evaluated for a racing heart in the emergency department numerous times in the past. She has undergone imaging of her chest, which returned unremarkable and on 4/8/19 had a 24 hour Holter monitor placed. In addition to this, the patient has tried gabapentin, hydroxyzine and ativan, but continues to have symptoms.Today the patient reports that she awoke feeling anxious and dizzy. She was unable to take her methadone and this only exacerbated her symptoms, prompting arrival here today. The patient has been in and out of the ED, traveling between her mother's home and her own home, and states that she thinks that she might have misplaced her medications. Furthermore, she notes that her blood sugar has been high and she is concerned that she could be diabetic. The patient does not otherwise have a history of high blood sugars and her most recent lab studies on 4/6/19 reveal a blood sugar of 72. Here the patient continues to endorse room spinning dizziness with associated nausea, lightheadedness and a headache that shoots through her temporal regions bilaterally with occasional \"tingling.\" She reports midsternal chest pain that radiates into her left arm pit and arm and notes that at times this feels hot. Additionally, she has mild ongoing ankle swelling bilaterally, but she denies cough or shortness of breath. She denies vomiting or any further concerns or symptoms otherwise such as hematuria, dysuria, frequency or urgency.      Allergies:  Amoxicillin  Bupropion  Ephedrine    Medications:    Albuterol   Atarax   Methadone     Past Medical History:    Anxiety   Asthma   Chronic pain "   Depression   Fibromyalgia   PTSD     Past Surgical History:    History reviewed. No pertinent surgical history.     Family History:    History reviewed. No pertinent family history.      Social History:  The patient was not accompanied to the ED.  Smoking Status: Current every day   Smokeless Tobacco: Never  Alcohol Use: No    Marital Status:  Single [1]    Review of Systems   Constitutional: Negative for fever.   Respiratory: Negative for cough and shortness of breath.    Cardiovascular: Positive for chest pain, palpitations and leg swelling.   Gastrointestinal: Positive for abdominal pain and nausea. Negative for vomiting.   Genitourinary: Negative for difficulty urinating, dysuria, frequency, hematuria and urgency.   Musculoskeletal: Positive for myalgias (left arm).   Neurological: Positive for dizziness, light-headedness and headaches.   Psychiatric/Behavioral: The patient is nervous/anxious.    All other systems reviewed and are negative.      Physical Exam     Patient Vitals for the past 24 hrs:   BP Temp Temp src Heart Rate Resp SpO2 Weight   04/09/19 1645 121/78 -- -- 71 -- 99 % --   04/09/19 1600 123/81 -- -- 91 -- 97 % --   04/09/19 1526 -- -- -- -- -- -- 79.4 kg (175 lb)   04/09/19 1524 (!) 158/94 98.9  F (37.2  C) Oral 112 20 100 % --       Physical Exam  Constitutional: anxious, but well appearing, no distress  Head: No external signs of trauma noted to head or face.   Eyes: Pupils are equal, round, and reactive to light. Conjunctiva normal. EOMI.  ENT: MMM. Normal voice.   Neck: normal ROM.    Cardiovascular: Normal rate, regular rhythm, and intact distal pulses.    Respiratory: Effort normal. No respiratory distress. Lungs clear to auscultation bilaterally. Chest wall non-tender to palpation.   GI: Soft. There is no tenderness.   Musculoskeletal: No deformities appreciated. Normal ROM. No edema noted.  Neurological: Alert and Oriented x 3. Speech normal. Moves all extremities equally. CN II-XII  intact. Coordination normal. Normal strength and sensation in upper and lower extremities. Gait normal.   Psychiatric: anxious and tearful  Skin: Skin is warm and dry.        Emergency Department Course     ECG (15:27:27):  Rate 100 bpm. HI interval 156. QRS duration 94. QT/QTc 362/466. P-R-T axes 56 22 8. Normal sinus rhythm. Interpreted at 1530 by Gwendolyn Rain PA-C.     Imaging:  Radiology findings were communicated with the patient who voiced understanding of the findings.    Chest XR 2 Views:  IMPRESSION: No active infiltrates    As read by radiology    Laboratory:  Laboratory findings were communicated with the patient who voiced understanding of the findings.    CBC: WNL (WBC 6.3, HGB 12.7, )  CMP:  (H), o/w WNL (Creatinine 0.84)  Troponin (Collected 155): <0.015   D-dimer: 0.5  TSH with free T4 reflex: 0.30 (L)  T4 Free: 0.84    HCG Qualitative: Negative     Interventions:  1553 - NS Bolus 1,000mL IV     Emergency Department Course:  Nursing notes and vitals reviewed.    1530: I performed an exam of the patient as documented above.     1707: I rechecked the patient. Findings and plan explained to the Patient. Patient discharged home with instructions regarding supportive care, medications, and reasons to return. The importance of close follow-up was reviewed.     Impression & Plan      Medical Decision Makin year old female presenting with multiple complaints, including dizziness, tachycardia, chest pain. EKG showed normal sinus rhythm without evidence of ischemia or arrhythmia. Troponin is negative. Symptoms have been constant for several days so this is sufficient to rule out ACS at this time given atypical symptoms. D-dimer is within normal limits, making PE unlikely. CXR does not reveal any evidence of pneumonia, pulmonary edema, or pneumothorax. She complains of tachycardia, and did initially have mild tachycardia on arrival, but this resolved with IV fluids. There is no  evidence of any arrhythmia on cardiac monitoring. Her TSH is mildly low, but T4 is within normal limits. She was informed of this result and instructed to follow-up with primary care to have it rechecked. She complains of dizziness and headaches, but these sound chronic in nature. She has a normal neurologic exam and is not febrile. I do not feel she requires imaging or LP at this time as I have low suspicion for stroke, meningitis, tumor, intracranial hemorrhage, or other serious pathology. Patient has been seen for these symptoms multiple times over the past several weeks with negative work-ups each time. She is extremely anxious and I suspect some of her symptoms are related to anxiety. She also did not take her methadone today and does not have her ativan either so some of her symptoms could be attributed to this. She does not appear to be in benzo withdrawal at this time. Her mild tachycardia has resolved and her vital signs are normal. With her negative evaluation today I think she is appropriate for discharge home with close follow-up with her primary care provider. She was instructed to return to the ED for any new or worsening symptoms.     Diagnosis:    ICD-10-CM    1. Chest pain, unspecified type R07.9    2. Dizziness R42        Disposition:  discharged to home      Ne Lacey  4/9/2019   [facility] EMERGENCY DEPARTMENT  I, Ne Lacey, am serving as a scribe at 3:30 PM on 4/9/2019 to document services personally performed by Gwendolyn Rain PA-C based on my observations and the provider's statements to me.       Gwendolyn Rain PA-C  04/09/19 1732

## 2019-04-09 NOTE — ED AVS SNAPSHOT
[facility] Emergency Department  [address]  [facility] [address]  Phone:  [phone]  Fax:  [phone]                                    Lisseth Andrews   MRN: [id number]    Department:  [facility] Emergency Department   Date of Visit:  4/9/2019           After Visit Summary Signature Page    I have received my discharge instructions, and my questions have been answered. I have discussed any challenges I see with this plan with the nurse or doctor.    ..........................................................................................................................................  Patient/Patient Representative Signature      ..........................................................................................................................................  Patient Representative Print Name and Relationship to Patient    ..................................................               ................................................  Date                                   Time    ..........................................................................................................................................  Reviewed by Signature/Title    ...................................................              ..............................................  Date                                               Time          22EPIC Rev 08/18

## 2019-04-10 LAB — INTERPRETATION ECG - MUSE: NORMAL

## 2019-07-08 ENCOUNTER — HOSPITAL ENCOUNTER (EMERGENCY)
Facility: CLINIC | Age: 29
Discharge: HOME | End: 2019-07-08
Attending: NURSE PRACTITIONER | Admitting: NURSE PRACTITIONER
Payer: COMMERCIAL

## 2019-07-08 ENCOUNTER — APPOINTMENT (OUTPATIENT)
Dept: ULTRASOUND IMAGING | Facility: CLINIC | Age: 29
End: 2019-07-08
Attending: PHYSICIAN ASSISTANT
Payer: COMMERCIAL

## 2019-07-08 VITALS
SYSTOLIC BLOOD PRESSURE: 121 MMHG | DIASTOLIC BLOOD PRESSURE: 81 MMHG | RESPIRATION RATE: 16 BRPM | WEIGHT: 182.76 LBS | OXYGEN SATURATION: 99 % | BODY MASS INDEX: 31.37 KG/M2 | TEMPERATURE: 98.8 F | HEART RATE: 69 BPM

## 2019-07-08 DIAGNOSIS — O03.4 RETAINED PRODUCTS OF CONCEPTION FOLLOWING ABORTION: ICD-10-CM

## 2019-07-08 LAB
ABO + RH BLD: ABNORMAL
ABO + RH BLD: ABNORMAL
ANION GAP SERPL CALCULATED.3IONS-SCNC: 6 MMOL/L (ref 3–14)
B-HCG SERPL-ACNC: 2344 IU/L (ref 0–5)
BASOPHILS # BLD AUTO: 0 10E9/L (ref 0–0.2)
BASOPHILS NFR BLD AUTO: 0.4 %
BLD GP AB INVEST PLASRBC-IMP: ABNORMAL
BLD GP AB SCN SERPL QL: ABNORMAL
BLOOD BANK CMNT PATIENT-IMP: ABNORMAL
BUN SERPL-MCNC: 11 MG/DL (ref 7–30)
CALCIUM SERPL-MCNC: 9.2 MG/DL (ref 8.5–10.1)
CHLORIDE SERPL-SCNC: 104 MMOL/L (ref 94–109)
CO2 SERPL-SCNC: 27 MMOL/L (ref 20–32)
CREAT SERPL-MCNC: 0.8 MG/DL (ref 0.52–1.04)
DIFFERENTIAL METHOD BLD: NORMAL
EOSINOPHIL # BLD AUTO: 0.1 10E9/L (ref 0–0.7)
EOSINOPHIL NFR BLD AUTO: 1.8 %
ERYTHROCYTE [DISTWIDTH] IN BLOOD BY AUTOMATED COUNT: 12.8 % (ref 10–15)
GFR SERPL CREATININE-BSD FRML MDRD: >90 ML/MIN/{1.73_M2}
GLUCOSE SERPL-MCNC: 72 MG/DL (ref 70–99)
HCT VFR BLD AUTO: 35.7 % (ref 35–47)
HGB BLD-MCNC: 12.1 G/DL (ref 11.7–15.7)
IMM GRANULOCYTES # BLD: 0 10E9/L (ref 0–0.4)
IMM GRANULOCYTES NFR BLD: 0.1 %
LYMPHOCYTES # BLD AUTO: 1.6 10E9/L (ref 0.8–5.3)
LYMPHOCYTES NFR BLD AUTO: 24.3 %
MCH RBC QN AUTO: 29.4 PG (ref 26.5–33)
MCHC RBC AUTO-ENTMCNC: 33.9 G/DL (ref 31.5–36.5)
MCV RBC AUTO: 87 FL (ref 78–100)
MONOCYTES # BLD AUTO: 0.5 10E9/L (ref 0–1.3)
MONOCYTES NFR BLD AUTO: 7.1 %
NEUTROPHILS # BLD AUTO: 4.5 10E9/L (ref 1.6–8.3)
NEUTROPHILS NFR BLD AUTO: 66.3 %
NRBC # BLD AUTO: 0 10*3/UL
NRBC BLD AUTO-RTO: 0 /100
PLATELET # BLD AUTO: 257 10E9/L (ref 150–450)
POTASSIUM SERPL-SCNC: 3.9 MMOL/L (ref 3.4–5.3)
RBC # BLD AUTO: 4.12 10E12/L (ref 3.8–5.2)
SODIUM SERPL-SCNC: 137 MMOL/L (ref 133–144)
SPECIMEN EXP DATE BLD: ABNORMAL
WBC # BLD AUTO: 6.7 10E9/L (ref 4–11)

## 2019-07-08 PROCEDURE — 96374 THER/PROPH/DIAG INJ IV PUSH: CPT

## 2019-07-08 PROCEDURE — 86901 BLOOD TYPING SEROLOGIC RH(D): CPT | Performed by: PHYSICIAN ASSISTANT

## 2019-07-08 PROCEDURE — 25000128 H RX IP 250 OP 636: Performed by: PHYSICIAN ASSISTANT

## 2019-07-08 PROCEDURE — 85025 COMPLETE CBC W/AUTO DIFF WBC: CPT | Performed by: PHYSICIAN ASSISTANT

## 2019-07-08 PROCEDURE — 86900 BLOOD TYPING SEROLOGIC ABO: CPT | Performed by: PHYSICIAN ASSISTANT

## 2019-07-08 PROCEDURE — 80048 BASIC METABOLIC PNL TOTAL CA: CPT | Performed by: PHYSICIAN ASSISTANT

## 2019-07-08 PROCEDURE — 84702 CHORIONIC GONADOTROPIN TEST: CPT | Performed by: PHYSICIAN ASSISTANT

## 2019-07-08 PROCEDURE — 86870 RBC ANTIBODY IDENTIFICATION: CPT | Performed by: PHYSICIAN ASSISTANT

## 2019-07-08 PROCEDURE — 99285 EMERGENCY DEPT VISIT HI MDM: CPT | Mod: 25

## 2019-07-08 PROCEDURE — 86850 RBC ANTIBODY SCREEN: CPT | Performed by: PHYSICIAN ASSISTANT

## 2019-07-08 PROCEDURE — 93976 VASCULAR STUDY: CPT

## 2019-07-08 RX ORDER — KETOROLAC TROMETHAMINE 15 MG/ML
15 INJECTION, SOLUTION INTRAMUSCULAR; INTRAVENOUS ONCE
Status: COMPLETED | OUTPATIENT
Start: 2019-07-08 | End: 2019-07-08

## 2019-07-08 RX ORDER — DOXYCYCLINE 100 MG/1
100 CAPSULE ORAL 2 TIMES DAILY
Qty: 14 CAPSULE | Refills: 0 | Status: SHIPPED | OUTPATIENT
Start: 2019-07-08 | End: 2019-07-15

## 2019-07-08 RX ORDER — METHYLERGONOVINE MALEATE 0.2 MG/1
0.2 TABLET ORAL 3 TIMES DAILY
Qty: 6 TABLET | Refills: 0 | Status: SHIPPED | OUTPATIENT
Start: 2019-07-08 | End: 2019-07-10

## 2019-07-08 RX ADMIN — KETOROLAC TROMETHAMINE 15 MG: 15 INJECTION, SOLUTION INTRAMUSCULAR; INTRAVENOUS at 18:26

## 2019-07-08 ASSESSMENT — ENCOUNTER SYMPTOMS
SHORTNESS OF BREATH: 0
ABDOMINAL PAIN: 1
NAUSEA: 0
FEVER: 0
VOMITING: 0

## 2019-07-08 NOTE — ED AVS SNAPSHOT
[facility] Emergency Department  [address]  [facility] [address]  Phone:  [phone]  Fax:  [phone]                                    Lisseth Andrews   MRN: [id number]    Department:  [facility] Emergency Department   Date of Visit:  7/8/2019           After Visit Summary Signature Page    I have received my discharge instructions, and my questions have been answered. I have discussed any challenges I see with this plan with the nurse or doctor.    ..........................................................................................................................................  Patient/Patient Representative Signature      ..........................................................................................................................................  Patient Representative Print Name and Relationship to Patient    ..................................................               ................................................  Date                                   Time    ..........................................................................................................................................  Reviewed by Signature/Title    ...................................................              ..............................................  Date                                               Time          22EPIC Rev 08/18

## 2019-07-08 NOTE — ED TRIAGE NOTES
Pt reports she has a medical pill  on . Pt has been having vaginal bleeding since . Pt states it slowed down for a while but a few days ago she started passing clots and has passed several today. Pt is changing her pad several times an hour. Pt states she is feeling weak and dizzy.

## 2019-07-08 NOTE — ED PROVIDER NOTES
History     Chief Complaint:  Vaginal Bleeding    HPI   Lisseth Andrews is a 29 year old female, with a history of a recent induced , A2, who presents with her significant other to the emergency department for evaluation of vaginal bleeding. The patient reports she recently had an induced  at 7 weeks gestation using Misoprostol at a [facility] on 19, approximately three weeks prior to evaluation. The patient reports she had heavy vaginal bleeding immediately after the induced  that then tapered off until four days prior to evaluation when she started experiencing increased amounts of vaginal bleeding with clots. She notes the bleeding has been the worse the past two days prior to evaluation. She notes she does experiencing abdominal cramping approximately half an hour prior to passing clots. She denies any fever, nausea, vomiting, chest pain or shortness of breath. She denies any history of abnormal vaginal bleeding but does note a history of PCOS 5 years prior to evaluation which she is currently not being treated for with no complications. She does note she had complications during her first pregnancy approximately seven years ago in which she went into cardiac arrest.    Allergies:  Amoxicillin: rash  Bupropion: headache  Ephedrine: palpitations     Medications:    Albuterol  Hydroxyzine  Methadone  Sertraline  Zofran  Genny  Propranolol  Metoprolol    Past Medical History:    GERD  TBI  PTSD  Patellofemoral syndrome  SI joint dysfunction  Panic disorder  Fibromyalgia  Anxiety  Asthma    Past Surgical History:    Tibia shaft fracture repair    Family History:    Alcohol/drug  Depression  Asthma  GI disease    Social History:  Smoking status: Current every day smoker of 0.25 ppd  Alcohol use: No  The patient presents to the emergency department with her significant other.  Marital Status:  Single [1]     Review of Systems   Constitutional: Negative for fever.    Respiratory: Negative for shortness of breath.    Cardiovascular: Negative for chest pain.   Gastrointestinal: Positive for abdominal pain. Negative for nausea and vomiting.   Genitourinary: Positive for vaginal bleeding.   All other systems reviewed and are negative.    Physical Exam   Patient Vitals for the past 24 hrs:   BP Temp Temp src Pulse Heart Rate Resp SpO2 Weight   07/08/19 2000 121/81 -- -- 69 -- -- 99 % --   07/08/19 1819 -- -- -- -- -- -- 100 % --   07/08/19 1815 (!) 131/95 -- -- 67 -- -- 99 % --   07/08/19 1800 126/87 -- -- 61 -- -- 100 % --   07/08/19 1644 (!) 130/92 98.8  F (37.1  C) Oral 69 69 16 100 % 82.9 kg (182 lb 12.2 oz)     Physical Exam  General: Tearful and anxious.  Head:  Scalp is NC/AT  Eyes:  No scleral icterus, PERRL  ENT:  The external nose and ears are normal.   Neck:  Normal range of motion without rigidity.  CV:  Regular rate and rhythm    No pathologic murmur, rubs, or gallops.  Resp:  Breath sounds are clear bilaterally.  No crackles, wheezes, rhonchi.    Non-labored, no retractions or accessory muscle use  GI:  Abdomen is soft, no distension, no tenderness, no masses. No peritoneal signs.  Bowel sounds present in all quadrants  :  No suprapubic or flank tenderness. Pelvic exam declined.  MS:  No lower extremity edema or asymmetric calf swelling.  Skin:  Warm and dry, No rash or lesions noted.  Neuro: Oriented. No gross motor deficits.  Psych:  Awake. Alert. Normal affect. Appropriate interactions.    Emergency Department Course   Imaging:  Radiographic findings were communicated with the patient who voiced understanding of the findings.    US Pelvis Cmplt w Transvag & Doppler LmtPel Duplex Limited  IMPRESSION: Probable retained products of conception with thickened,  heterogeneous endometrial stripe and internal color Doppler flow. No  evidence of ovarian torsion or mass. No pelvic free fluid.  As read by Radiology.    Laboratory:  CBC: AWNL (WBC 6.7, HGB 12.1, PLT  257)  BMP: AWNL (Creatinine 0.80)    ABO/Rh type and screen: A- (Antibody Pos)  HCG : 2,344 (H)    Interventions:  1826 Toradol 15 mg IV    Emergency Department Course:  Past medical records, nursing notes, and vitals reviewed.  1800: I performed an exam of the patient and obtained history, as documented above.    IV inserted and blood drawn.    The patient was sent for a pelvic ultrasound while in the emergency department, findings above.    : I rechecked the patient.     : I rechecked the patient.    : I rechecked the patient. Explained findings to the patient and her significant other.    : I rechecked the patient. The patient requests to leave AMA and signs the paperwork, but ultimately decides to stay until OB/GYN consult has been completed.    : I discussed the patient with Dr. Balderas, of OB/GYN.    : I rechecked the patient. Findings and plan explained to the Patient. Patient discharged home with instructions regarding supportive care, medications, and reasons to return. The importance of close follow-up was reviewed.   Impression & Plan    Medical Decision Makin-year-old female approximately 3 weeks status post medically induced  presents with ongoing vaginal bleeding.  Patient history and records reviewed.  Work-up here reveals findings consistent with possible retained products of conception.  The patient is hemodynamically stable, and has a hemoglobin of 12.1, down approximately 1-1/2 points from a month prior.  She is afebrile with normal white blood cell count, no significant tenderness to palpation, and no evidence of ongoing infection at this time.  Patient discussed with on-call OB/GYN as above who recommended against dilation and curettage at this time given long-term risks and evaluation today in the ED.  He recommended Methergine as below, and doxycycline for  infection prophylaxis, and prescriptions given as below.  OB also recommended close follow-up  tomorrow with OBG[facility] from [facility].  I feel she is safe for discharge to home at this time with close follow-up in the morning.  This was discussed with the patient, and the importance of returning to the emergency department if she develops any new or worsening symptoms such as increased abdominal pain, bleeding, syncope, fever, etc. the patient did attempt to leave AGAINST MEDICAL ADVICE on several times prior to the completion of the evaluation, but I was ultimately able to convince the patient to stay until her evaluation was completed and I was able to consult with OB.  However per nursing notes the patient actually left the ED prior to receiving her prescriptions and discharge instructions, and attempts were made to contact her by phone to arrange for her to return to pick these up.    Diagnosis:    ICD-10-CM   1. Retained products of conception following  O03.4     Disposition:  Discharged home with instructions for follow up.    Discharge Medications:  Started    doxycycline hyclate 100 MG capsule  Commonly known as:  VIBRAMYCIN  100 mg, Oral, 2 TIMES DAILY     methylergonovine 0.2 MG tablet  Commonly known as:  METHERGINE  0.2 mg, Oral, 3 TIMES DAILY       Cristal Morrissey  2019   [facility] EMERGENCY DEPARTMENT  Scribe Disclosure:  I, Cristal Morrissey, am serving as a scribe at 6:00 PM on 2019 to document services personally performed by Luis Engle PA-C based on my observations and the provider's statements to me.           Luis Engle PA-C  [phone]

## 2019-07-09 NOTE — ED NOTES
Pt left without AVS or Rx. Pt called at listed mobile phone number and message left informing pt of ability to return to ED to obtain Rx and ensure IV removal.

## 2019-07-09 NOTE — DISCHARGE INSTRUCTIONS
Discharge Instructions  Vaginal Bleeding    You were seen today for unusual vaginal bleeding. Heavy or irregular bleeding may be caused by many different things such as hormone changes, infection, birth control, or cancer. At this time your provider does not find that your bleeding is a sign of anything dangerous or life-threatening, and you have not lost enough blood to be dangerous.  However, sometimes the signs of serious illness do not show up right away.  If you have new or worse symptoms, you may need to be seen again in the Emergency Department or by your primary provider.      Generally, every Emergency Department visit should have a follow-up clinic visit with either a primary or a specialty clinic/provider. Please follow-up as instructed by your emergency provider today.    Return to the Emergency Department if:  You feel lightheaded or faint.  Your bleeding becomes much heavier than it is now.  You have severe cramping or abdominal (belly) pain.  You have any new or different symptoms.    Treatment:  Motrin  or Advil  (ibuprofen) can help relieve cramps and can also decrease bleeding. You may use this according to the directions on the package. Do not use a medicine that you are allergic to, or if your provider has told you not to use it.  Hormone pills or birth control pills are occasionally prescribed to help control the bleeding but often this is left to an OB/GYN provider. These can cause problems if you have a history of blood clots or stroke, so tell your provider if you have these problems before you leave.  Iron tablets may be recommended if you have anemia (low blood count.) Iron can cause constipation, so be sure to have plenty of fiber in your diet and let your provider know if you have problems.  Drinking plenty of fluid is important. Be sure to drink extra water or other healthy drinks.  If you were given a prescription for medicine here today, be sure to read all of the information  (including the package insert) that comes with your prescription.  This will include important information about the medicine, its side effects, and any warnings that you need to know about.  The pharmacist who fills the prescription can provide more information and answer questions you may have about the medicine.  If you have questions or concerns that the pharmacist cannot address, please call or return to the Emergency Department.     Remember that you can always come back to the Emergency Department if you are not able to see your regular provider in the amount of time listed above, if you get any new symptoms, or if there is anything that worries you.

## 2019-09-16 ENCOUNTER — HOSPITAL ENCOUNTER (EMERGENCY)
Facility: CLINIC | Age: 29
Discharge: HOME | End: 2019-09-16
Attending: EMERGENCY MEDICINE | Admitting: EMERGENCY MEDICINE
Payer: COMMERCIAL

## 2019-09-16 VITALS
OXYGEN SATURATION: 98 % | SYSTOLIC BLOOD PRESSURE: 124 MMHG | RESPIRATION RATE: 20 BRPM | HEART RATE: 93 BPM | TEMPERATURE: 99.1 F | BODY MASS INDEX: 29.99 KG/M2 | DIASTOLIC BLOOD PRESSURE: 74 MMHG | WEIGHT: 180 LBS | HEIGHT: 65 IN

## 2019-09-16 DIAGNOSIS — R11.2 NON-INTRACTABLE VOMITING WITH NAUSEA, UNSPECIFIED VOMITING TYPE: ICD-10-CM

## 2019-09-16 DIAGNOSIS — R05.9 COUGH: ICD-10-CM

## 2019-09-16 DIAGNOSIS — J34.89 SINUS PRESSURE: ICD-10-CM

## 2019-09-16 LAB
DEPRECATED S PYO AG THROAT QL EIA: NORMAL
SPECIMEN SOURCE: NORMAL

## 2019-09-16 PROCEDURE — 25000131 ZZH RX MED GY IP 250 OP 636 PS 637: Performed by: EMERGENCY MEDICINE

## 2019-09-16 PROCEDURE — 25000125 ZZHC RX 250

## 2019-09-16 PROCEDURE — 99283 EMERGENCY DEPT VISIT LOW MDM: CPT | Mod: 25

## 2019-09-16 PROCEDURE — 25000132 ZZH RX MED GY IP 250 OP 250 PS 637: Performed by: EMERGENCY MEDICINE

## 2019-09-16 PROCEDURE — 87880 STREP A ASSAY W/OPTIC: CPT | Performed by: EMERGENCY MEDICINE

## 2019-09-16 PROCEDURE — 87081 CULTURE SCREEN ONLY: CPT | Performed by: EMERGENCY MEDICINE

## 2019-09-16 PROCEDURE — 94640 AIRWAY INHALATION TREATMENT: CPT

## 2019-09-16 RX ORDER — ONDANSETRON 4 MG/1
4 TABLET, ORALLY DISINTEGRATING ORAL EVERY 8 HOURS PRN
Qty: 10 TABLET | Refills: 0 | Status: SHIPPED | OUTPATIENT
Start: 2019-09-16

## 2019-09-16 RX ORDER — ONDANSETRON 4 MG/1
4 TABLET, ORALLY DISINTEGRATING ORAL
Status: COMPLETED | OUTPATIENT
Start: 2019-09-16 | End: 2019-09-16

## 2019-09-16 RX ORDER — IPRATROPIUM BROMIDE AND ALBUTEROL SULFATE 2.5; .5 MG/3ML; MG/3ML
3 SOLUTION RESPIRATORY (INHALATION)
Status: DISCONTINUED | OUTPATIENT
Start: 2019-09-16 | End: 2019-09-16 | Stop reason: HOSPADM

## 2019-09-16 RX ORDER — OXYMETAZOLINE HYDROCHLORIDE 0.05 G/100ML
2 SPRAY NASAL ONCE
Status: DISCONTINUED | OUTPATIENT
Start: 2019-09-16 | End: 2019-09-16

## 2019-09-16 RX ORDER — IBUPROFEN 600 MG/1
600 TABLET, FILM COATED ORAL ONCE
Status: COMPLETED | OUTPATIENT
Start: 2019-09-16 | End: 2019-09-16

## 2019-09-16 RX ORDER — OXYMETAZOLINE HYDROCHLORIDE 0.05 G/100ML
2 SPRAY NASAL 2 TIMES DAILY
Qty: 1 BOTTLE | Refills: 0 | Status: SHIPPED | OUTPATIENT
Start: 2019-09-16 | End: 2019-09-19

## 2019-09-16 RX ORDER — ACETAMINOPHEN 325 MG/1
975 TABLET ORAL ONCE
Status: COMPLETED | OUTPATIENT
Start: 2019-09-16 | End: 2019-09-16

## 2019-09-16 RX ORDER — IPRATROPIUM BROMIDE AND ALBUTEROL SULFATE 2.5; .5 MG/3ML; MG/3ML
SOLUTION RESPIRATORY (INHALATION)
Status: COMPLETED
Start: 2019-09-16 | End: 2019-09-16

## 2019-09-16 RX ORDER — ALBUTEROL SULFATE 90 UG/1
2 AEROSOL, METERED RESPIRATORY (INHALATION) EVERY 6 HOURS PRN
Qty: 1 INHALER | Refills: 0 | Status: SHIPPED | OUTPATIENT
Start: 2019-09-16

## 2019-09-16 RX ADMIN — IBUPROFEN 600 MG: 600 TABLET, FILM COATED ORAL at 08:32

## 2019-09-16 RX ADMIN — ACETAMINOPHEN 975 MG: 325 TABLET, FILM COATED ORAL at 08:32

## 2019-09-16 RX ADMIN — IPRATROPIUM BROMIDE AND ALBUTEROL SULFATE 3 ML: .5; 3 SOLUTION RESPIRATORY (INHALATION) at 08:52

## 2019-09-16 RX ADMIN — ONDANSETRON 4 MG: 4 TABLET, ORALLY DISINTEGRATING ORAL at 08:32

## 2019-09-16 RX ADMIN — IPRATROPIUM BROMIDE AND ALBUTEROL SULFATE 3 ML: 2.5; .5 SOLUTION RESPIRATORY (INHALATION) at 08:52

## 2019-09-16 ASSESSMENT — ENCOUNTER SYMPTOMS
SORE THROAT: 1
COUGH: 1
SINUS PRESSURE: 1
DIZZINESS: 1
PHOTOPHOBIA: 1
NAUSEA: 1
ABDOMINAL PAIN: 1
FEVER: 1
SHORTNESS OF BREATH: 1
CHEST TIGHTNESS: 1
VOMITING: 1

## 2019-09-16 ASSESSMENT — MIFFLIN-ST. JEOR: SCORE: 1552.35

## 2019-09-16 NOTE — ED PROVIDER NOTES
History     Chief Complaint:    Cough    The history is provided by the patient.      Lisseth Andrews is a 29 year old female with a history of asthma and hypertension who presents with a cough. The patient's symptoms began 3 days ago. She reports a sore throat, dizziness, ear pain, photophobia, vomiting, chest tightness, sinus pressure, shortness of breath, nausea, fevers, decreased urination, body aches, abdominal pain, and chest pain. She also reports ear itchiness. Her right ear is worse than her left. There were streaks of blood present in her vomit and her temperature was recorded at home at 101 degrees F. The patient denies any rashes. She denies any chance or pregnancy. She has tried taking Tylenol sinus medication, but it has provided little relief for her.    Allergies:  Amoxicillin  Blood-Group Specific Substance  Bupropion  Citalopram  Hydrocodone-Acetaminophen  Ephedrine  Buspirone    Medications:    Albuterol  Atarax  Methadone  Zofran  Dolophine  Aspirin 81 mg  Lopressor  Inderal  Ativan  Vistaril  Zoloft  Keflex    Past Medical History:    Spontaneous vaginal delivery  Suspected posterior urethral valves, urethrocele vs hypospadias  E. Coli UTI  Hypertension  Myopia of both eyes with astigmatism  GERD  Misuse of prescription drugs  TBI  Patellofemoral syndrome  SI joint dysfunction  Exercise-induced bronchospasm  Panic disorder without agoraphobia  Narcotic dependence, continuous  Opiate dependence, continuous  Fetal anomaly  Chronic allergic conjunctivitis  Allergic rhinitis  Anxiety  Asthma  Chronic pain  Depression  Fibromyalgia  PTSD  Concussion    Past Surgical History:    Closed tx tibia shaft fx    Family History:    Depression - father  Cirrhosis for ETOH abuse - father  Ulcers - mother  Depression - mother  Anxiety - mother  Asthma - sister  Ulcers - sister    Social History:  Positive for tobacco use - trying to quit.  Negative for alcohol use.  Negative for drug use.  Patient  "accompanied to the ER by her .  Marital Status:  Single [1]    Review of Systems   Constitutional: Positive for fever.   HENT: Positive for ear pain, sinus pressure and sore throat.    Eyes: Positive for photophobia.   Respiratory: Positive for cough, chest tightness and shortness of breath.    Cardiovascular: Positive for chest pain.   Gastrointestinal: Positive for abdominal pain, nausea and vomiting.   Genitourinary: Positive for decreased urine volume.   Skin: Negative for rash.   Neurological: Positive for dizziness.   All other systems reviewed and are negative.      Physical Exam     Patient Vitals for the past 24 hrs:   BP Temp Temp src Pulse Resp SpO2 Height Weight   09/16/19 0806 124/74 99.1  F (37.3  C) Temporal 93 20 98 % 1.651 m (5' 5\") 81.6 kg (180 lb)     Physical Exam    General: Patient is awake, alert and interactive when I enter the room. Appears uncomfortable.  Head: The scalp, face, and head appear normal  Eyes: The pupils are equal, round, and reactive to light. Conjunctivae and sclerae are normal  ENT: External acoustic canals are normal. Right ear mild excoriations in external canal with normal appearing TMs. The oropharynx with mild erythema without exudate. Uvula is in the midline  Neck: Normal range of motion. No anterior cervical lymphadenopathy noted  CV: Regular rate. S1/S2. No murmurs.   Resp: Lungs are clear without wheezes or rales. No respiratory distress.   GI: Abdomen is soft, no rigidity, guarding, or rebound. No distension. No tenderness to palpation in any quadrant.     MS: Normal tone. Joints grossly normal without effusions. No asymmetric leg swelling, calf or thigh tenderness.    Skin: No rash or lesions noted. Normal capillary refill noted  Neuro: Speech is normal and fluent. Face is symmetric. Moving all extremities.   Psych:  Normal affect.  Appropriate interactions.    Emergency Department Course     Laboratory:  Laboratory findings were communicated with the " patient who voiced understanding of the findings.    Rapid strep screen: Negative  Beta strep group A culture: In process    Interventions:  0832: Zofran 4 mg PO  0832: Tylenol 975 mg PO  0832: Advil 600 mg PO  0852: Duoneb 3 mL Nebulization    Emergency Department Course:  Past medical records, nursing notes, and vitals reviewed.    0825: I performed an exam of the patient as documented above.     Rapid strep screen and Beta strep group A culture sent to lab.    0920: Patient rechecked and updated.  Patient feels much improved following the above interventions.    I personally reviewed the results with the Patient and answered all related questions prior to discharge.    Findings and plan explained to the Patient. Patient discharged home with instructions regarding supportive care, medications, and reasons to return. The importance of close follow-up was reviewed.      Impression & Plan     Medical Decision Making:  Patient is a 29-year-old female with past medical history of asthma and hypertension who presents to the emergency department with 3 days of upper URI type symptoms with associated cough, sinus pressure and some nausea vomiting.  On initial exam patient is hemodynamically stable.  Lung exam is reassuring and there is no sign of acute bacterial illness.  This is most likely a viral illness.  Do not feel blood work or chest x-ray was required at this time.  Patient was treated symptomatically with Zofran, Tylenol, ibuprofen and a DuoNeb with good improvement of her symptoms.  Patient will be given a prescription for Zofran, Afrin for sinus pressure and refill of her albuterol inhaler for symptomatic control.  Patient will need to follow-up with her primary care provider at [facility] the next 2 to 3 days to ensure improvement of symptoms.  I reviewed return precautions.      Discharge Diagnosis:    ICD-10-CM   1. Cough R05   2. Sinus pressure J34.89   3. Non-intractable vomiting with nausea,  unspecified vomiting type R11.2     Disposition:  Discharged to home.    Discharge Medications:  New Prescriptions    ALBUTEROL (PROAIR HFA/PROVENTIL HFA/VENTOLIN HFA) 108 (90 BASE) MCG/ACT INHALER    Inhale 2 puffs into the lungs every 6 hours as needed for shortness of breath / dyspnea or wheezing    OXYMETAZOLINE (AFRIN NASAL SPRAY) 0.05 % NASAL SPRAY    Spray 2 sprays in nostril 2 times daily for 3 days     Scribe Disclosure:  Alejandrina FUNEZ, am serving as a scribe at 8:57 AM on 9/16/2019 to document services personally performed by Rayray Hendrix MD based on my observations and the provider's statements to me.     Rayray Hendrix MD   9/16/2019   [facility] EMERGENCY DEPARTMENT       Rayray Hendrix MD  09/16/19 9180

## 2019-09-16 NOTE — ED AVS SNAPSHOT
[facility] Emergency Department  [address]  [facility] [address]  Phone:  [phone]  Fax:  [phone]                                    Lisseth Andrews   MRN: [id number]    Department:  [facility] Emergency Department   Date of Visit:  9/16/2019           After Visit Summary Signature Page    I have received my discharge instructions, and my questions have been answered. I have discussed any challenges I see with this plan with the nurse or doctor.    ..........................................................................................................................................  Patient/Patient Representative Signature      ..........................................................................................................................................  Patient Representative Print Name and Relationship to Patient    ..................................................               ................................................  Date                                   Time    ..........................................................................................................................................  Reviewed by Signature/Title    ...................................................              ..............................................  Date                                               Time          22EPIC Rev 08/18

## 2019-09-16 NOTE — DISCHARGE INSTRUCTIONS

## 2019-09-16 NOTE — ED TRIAGE NOTES
Pt has asthma, c/o tight chest, cough productive of yellow green phlegm, sore throat and sinus pressure for 3 days. Also c/o ringing in right ear and dizziness. Has vomited.

## 2019-09-17 NOTE — RESULT ENCOUNTER NOTE
Preliminary Beta strep group A r/o culture is PENDING and/or NEGATIVE at this time.   No changes in treatment per [place] Strep protocol.

## 2019-09-18 LAB
BACTERIA SPEC CULT: NORMAL
SPECIMEN SOURCE: NORMAL